# Patient Record
Sex: FEMALE | Race: ASIAN | Employment: FULL TIME | URBAN - METROPOLITAN AREA
[De-identification: names, ages, dates, MRNs, and addresses within clinical notes are randomized per-mention and may not be internally consistent; named-entity substitution may affect disease eponyms.]

---

## 2017-02-09 ENCOUNTER — TRANSFERRED RECORDS (OUTPATIENT)
Dept: HEALTH INFORMATION MANAGEMENT | Facility: CLINIC | Age: 32
End: 2017-02-09

## 2017-02-09 LAB
C TRACH DNA SPEC QL PROBE+SIG AMP: NEGATIVE
N GONORRHOEA DNA SPEC QL PROBE+SIG AMP: NEGATIVE
PAP-ABSTRACT: NORMAL
SPECIMEN DESCRIP: NORMAL
SPECIMEN DESCRIPTION: NORMAL

## 2017-04-25 ENCOUNTER — OFFICE VISIT (OUTPATIENT)
Dept: FAMILY MEDICINE | Facility: CLINIC | Age: 32
End: 2017-04-25
Payer: COMMERCIAL

## 2017-04-25 VITALS
WEIGHT: 131 LBS | BODY MASS INDEX: 23.21 KG/M2 | HEART RATE: 101 BPM | TEMPERATURE: 99.1 F | RESPIRATION RATE: 16 BRPM | SYSTOLIC BLOOD PRESSURE: 122 MMHG | DIASTOLIC BLOOD PRESSURE: 82 MMHG | HEIGHT: 63 IN

## 2017-04-25 DIAGNOSIS — N91.2 AMENORRHEA: Primary | ICD-10-CM

## 2017-04-25 DIAGNOSIS — F15.20 OTHER STIMULANT DEPENDENCE, UNCOMPLICATED (H): ICD-10-CM

## 2017-04-25 DIAGNOSIS — F90.0 ADHD (ATTENTION DEFICIT HYPERACTIVITY DISORDER), INATTENTIVE TYPE: ICD-10-CM

## 2017-04-25 LAB — BETA HCG QUAL IFA URINE: NEGATIVE

## 2017-04-25 PROCEDURE — 84703 CHORIONIC GONADOTROPIN ASSAY: CPT | Performed by: FAMILY MEDICINE

## 2017-04-25 PROCEDURE — 99203 OFFICE O/P NEW LOW 30 MIN: CPT | Performed by: FAMILY MEDICINE

## 2017-04-25 RX ORDER — DEXTROAMPHETAMINE SACCHARATE, AMPHETAMINE ASPARTATE, DEXTROAMPHETAMINE SULFATE AND AMPHETAMINE SULFATE 5; 5; 5; 5 MG/1; MG/1; MG/1; MG/1
20 TABLET ORAL
COMMUNITY
Start: 2017-03-12 | End: 2017-06-01

## 2017-04-25 RX ORDER — NORETHINDRONE ACETATE AND ETHINYL ESTRADIOL 1MG-20(21)
KIT ORAL
COMMUNITY
Start: 2017-02-09 | End: 2017-07-08

## 2017-04-25 RX ORDER — CHLORAL HYDRATE 500 MG
CAPSULE ORAL
COMMUNITY
Start: 2015-10-22 | End: 2018-07-23

## 2017-04-25 RX ORDER — LISDEXAMFETAMINE DIMESYLATE 30 MG/1
30 CAPSULE ORAL
COMMUNITY
Start: 2017-04-12 | End: 2017-05-12

## 2017-04-25 NOTE — Clinical Note
Please abstract the following data from this visit with this patient into the appropriate field in Epic:  Pap smear done on this date: 02/2017 (approximately), by this group: [facility], results were wnl.

## 2017-04-25 NOTE — LETTER
[facility]    04/25/17    Patient: Alejandra Huertas  YOB: 1985  Medical Record Number: [id number]                                                                  Controlled Substance Agreement  I understand that my care provider has prescribed controlled substances (narcotics, tranquilizers, and/or stimulants) to help manage my condition(s).  I am taking this medicine to help me function or work.  I know that this is strong medicine.  It could have serious side effects and even cause a dependency on the drug.  If I stop these medicines suddenly, I could have severe withdrawal symptoms.    The risks, benefits, and side effects of these medication(s) were explained to me.  I agree that:  1. I will take part in other treatments as advised by my provider.  This may be psychiatry or counseling, physical therapy, behavioral therapy, group treatment, or a referral to a pain clinic.  I will reduce or stop my medicine when my provider tells me to do so.   2. I will take my medicines as prescribed.  I will not change the dose or schedule unless my provider tells me to.  There will be no refills if I  run out early.   I may be contacted at any time without warning and asked to complete a drug test or pill count.   3. I will keep all my appointments at the clinic.  If I miss appointments or fail to follow instructions, my provider may stop my medicine.  4. I will not ask other providers to prescribe controlled substances. And I will not accept controlled substances from other people. If I need another prescribed controlled substance for a new reason, I will notify my provider within one business day.  5. If I enroll in the [place] Medical Marijuana program, I will tell my provider.  I will also sign an agreement to share my medical records with my provider.  6. I will use one pharmacy to fill all of my controlled substance prescriptions.  If my prescription is mailed to my pharmacy, it may  take 5 to 7 days for my medicine to be ready.  7. I understand that my provider, clinic care team, and pharmacy can track controlled substance prescriptions from other providers through a central database (prescription monitoring program).  8. I will bring in my list of medications (or my medicine bottles) each time I come to the clinic.  REV-  04/2016                                                                                                                                            Page 1 of 2      [facility]    04/25/17    Patient: Alejandra Huertas  YOB: 1985  Medical Record Number: [id number]    9. Refills of controlled substances will be made only during office hours.  It is up to me to make sure that I do not run out of my medicines on weekends or holidays.    10. I am responsible for my prescriptions.  If the medicine is lost or stolen, it will not be replaced.   I also agree not to share these medicines with anyone.  11. I agree to not use ANY illegal or recreational drugs.  This includes marijuana, cocaine, bath salts or other drugs.  I agree not to use alcohol unless my provider says I may.  I agree to give urine samples whenever asked.  If I fail to give a urine sample, the provider may stop my medicine.     12. I will tell my nurse or provider right away if I become pregnant or have a new medical problem treated outside of [facility].  13. I understand that this medicine can affect my thinking and judgment.  It may be unsafe for me to drive, use machinery and do dangerous tasks.  I will not do any of these things until I know how the medicine affects me.  If my dose changes, I will wait to see how it affects me.  I will contact my provider if I have concerns about medicine side effects.  I understand that if I do not follow any of the conditions above, my prescriptions or treatment may be stopped.    I agree that my provider, clinic care team, and pharmacy may  work with any city, state or federal law enforcement agency that investigates the misuse, sale, or other diversion of my controlled medicine. I will allow my provider to discuss my care with or share a copy of this agreement with any other treating provider, pharmacy or emergency room where I receive care.  I agree to give up (waive) any right of privacy or confidentiality with respect to these authorizations.   I have read this agreement and have asked questions about anything I did not understand.   ___________________________________    ___________________________  Patient Signature                                                           Date and Time  ___________________________________     ____________________________  Witness                                                                            Date and Time  ___________________________________  Roselia Shruthi Clancy MD  REV-  04/2016                                                                                                                                                                 Page 2 of 2

## 2017-04-25 NOTE — PROGRESS NOTES
SUBJECTIVE:                                                    Alejandra Huertas is a 31 year old female who presents to clinic today for the following health issues:    Establish care    Amenorrhea since 12/2016  Had copper IUD for about a year and took it out middle of last year. Following that she started continuous OCP for about 6 months and stopped that end of December. At that time, she was switched to monthly OCP.  Reports some spotting in December and possibly January but has not had a full period yet.     Patient is also switching her care to [place]. Has a history of ADHD- inattentive type.   Currently takes Vyvanse 30 mg and Adderall 20 mg at midday. States the midday adderall helps her complete her school assignments. Does not usually take it on the weekends when she is not in school.     Social- single parent.     Problem list and histories reviewed & adjusted, as indicated.  Additional history: as documented    Patient Active Problem List   Diagnosis     ADHD (attention deficit hyperactivity disorder), inattentive type     Adolescent idiopathic scoliosis     Controlled substance agreement signed     Other stimulant dependence, uncomplicated (H)     Past Surgical History:   Procedure Laterality Date     wisdom teeth  2007       Social History   Substance Use Topics     Smoking status: Never Smoker     Smokeless tobacco: Never Used     Alcohol use No     Family History   Problem Relation Age of Onset     Adopted: Yes           Reviewed and updated as needed this visit by clinical staff  Tobacco  Allergies  Surg Hx  Fam Hx  Soc Hx      Reviewed and updated as needed this visit by Provider         ROS:  Constitutional, HEENT, cardiovascular, pulmonary, GI, , musculoskeletal, neuro, skin, endocrine and psych systems are negative, except as otherwise noted.    OBJECTIVE:                                                    /82 (BP Location: Right arm, Patient Position: Chair, Cuff Size: Adult  "Regular)  Pulse 101  Temp 99.1  F (37.3  C) (Oral)  Resp 16  Ht 5' 3\" (1.6 m)  Wt 131 lb (59.4 kg)  LMP 12/26/2016  Breastfeeding? No  BMI 23.21 kg/m2  Body mass index is 23.21 kg/(m^2).  GENERAL: healthy, alert and no distress  HENT: ear canals and TM's normal, nose and mouth without ulcers or lesions  NECK: no adenopathy, no asymmetry, masses, or scars and thyroid normal to palpation  RESP: lungs clear to auscultation - no rales, rhonchi or wheezes  CV: regular rate and rhythm, normal S1 S2, no S3 or S4, no murmur, click or rub, no peripheral edema and peripheral pulses strong  ABDOMEN: soft, nontender, no hepatosplenomegaly, no masses and bowel sounds normal  MS: no gross musculoskeletal defects noted, no edema  PSYCH: mentation appears normal, affect normal/bright    Diagnostic Test Results:  Results for orders placed or performed in visit on 04/25/17   Beta HCG qual IFA urine   Result Value Ref Range    Beta HCG Qual IFA Urine Negative NEG        ASSESSMENT/PLAN:                                                        1. Amenorrhea  - UPT negative. Discussed with patient, it can take a while for periods to normalize especially with all the changes she has had over the last few months with OCP. Discussed stopping OCP use for now vs. Taking OCP daily as is for now and revisit in 3 months if periods have not regulated itself. Patient is agreeable to plan.   - Beta HCG qual IFA urine    2. ADHD (attention deficit hyperactivity disorder), inattentive type  - will continue patient on current regimen of vyvanse 30 mg in the morning and adderall 20mg in the afternoon. Controlled substance agreement signed today.     3. Other stimulant dependence, uncomplicated (H)  - as above     See Patient Instructions    Roselia Clancy MD  [facility]"

## 2017-04-25 NOTE — MR AVS SNAPSHOT
"              After Visit Summary   2017    Alejandra Huertas    MRN: [id number]           Patient Information     Date Of Birth          1985        Visit Information        Provider Department      2017 2:30 PM Roselia Clancy MD [facility]        Today's Diagnoses     Amenorrhea    -  1    ADHD (attention deficit hyperactivity disorder), inattentive type          Care Instructions    Follow up in 3 months or sooner if needed        Follow-ups after your visit        Who to contact     If you have questions or need follow up information about today's clinic visit or your schedule please contact [facility] directly at [phone].  Normal or non-critical lab and imaging results will be communicated to you by MyChart, letter or phone within 4 business days after the clinic has received the results. If you do not hear from us within 7 days, please contact the clinic through MyChart or phone. If you have a critical or abnormal lab result, we will notify you by phone as soon as possible.  Submit refill requests through [employer] or call your pharmacy and they will forward the refill request to us. Please allow 3 business days for your refill to be completed.          Additional Information About Your Visit        MyChart Information     [employer] lets you send messages to your doctor, view your test results, renew your prescriptions, schedule appointments and more. To sign up, go to [email or web address] . Click on \"Log in\" on the left side of the screen, which will take you to the Welcome page. Then click on \"Sign up Now\" on the right side of the page.     You will be asked to enter the access code listed below, as well as some personal information. Please follow the directions to create your username and password.     Your access code is: [email or web address]  Expires: 2017  3:08 PM     Your access code will  in 90 days. If you need help or a new code, " "please call your [facility] clinic or [phone].        Care EveryWhere ID     This is your Care EveryWhere ID. This could be used by other organizations to access your [facility] medical records  [id number]        Your Vitals Were     Pulse Temperature Respirations Height Last Period Breastfeeding?    101 99.1  F (37.3  C) (Oral) 16 5' 3\" (1.6 m) 12/26/2016 No    BMI (Body Mass Index)                   23.21 kg/m2            Blood Pressure from Last 3 Encounters:   04/25/17 122/82    Weight from Last 3 Encounters:   04/25/17 131 lb (59.4 kg)              We Performed the Following     Beta HCG qual IFA urine        Primary Care Provider    None Specified       No primary provider on file.        Thank you!     Thank you for choosing [facility]  for your care. Our goal is always to provide you with excellent care. Hearing back from our patients is one way we can continue to improve our services. Please take a few minutes to complete the written survey that you may receive in the mail after your visit with us. Thank you!             Your Updated Medication List - Protect others around you: Learn how to safely use, store and throw away your medicines at www.disposemymeds.org.          This list is accurate as of: 4/25/17  3:08 PM.  Always use your most recent med list.                   Brand Name Dispense Instructions for use    amphetamine-dextroamphetamine 20 MG per tablet    ADDERALL     Take 20 mg by mouth 1 tab QD       BLISOVI FE 1/20 1-20 MG-MCG per tablet   Generic drug:  norethindrone-ethinyl estradiol      Take 1 Tab by mouth daily.       fish oil-omega-3 fatty acids 1000 MG capsule      Take 2 capsules by mouth daily (every 24 hours).       lisdexamfetamine 30 MG capsule    VYVANSE     Take 30 mg by mouth Take 30 mg by mouth         "

## 2017-04-25 NOTE — NURSING NOTE
"Chief Complaint   Patient presents with     Amenorrhea     pt currenlty OCP and hasnt had menses since 12/2016     Attention Deficit Disorder     consult ADD       Initial /82 (BP Location: Right arm, Patient Position: Chair, Cuff Size: Adult Regular)  Pulse 101  Temp 99.1  F (37.3  C) (Oral)  Resp 16  Ht 5' 3\" (1.6 m)  Wt 131 lb (59.4 kg)  LMP 12/26/2016  Breastfeeding? No  BMI 23.21 kg/m2 Estimated body mass index is 23.21 kg/(m^2) as calculated from the following:    Height as of this encounter: 5' 3\" (1.6 m).    Weight as of this encounter: 131 lb (59.4 kg).  Medication Reconciliation: complete     Kristy Chow/PATIENCE  [place]      "

## 2017-04-28 PROBLEM — Z79.899 CONTROLLED SUBSTANCE AGREEMENT SIGNED: Status: ACTIVE | Noted: 2017-04-28

## 2017-04-28 PROBLEM — F15.20 OTHER STIMULANT DEPENDENCE, UNCOMPLICATED (H): Status: ACTIVE | Noted: 2017-04-28

## 2017-06-01 DIAGNOSIS — F90.0 ADHD (ATTENTION DEFICIT HYPERACTIVITY DISORDER), INATTENTIVE TYPE: ICD-10-CM

## 2017-06-01 RX ORDER — DEXTROAMPHETAMINE SACCHARATE, AMPHETAMINE ASPARTATE, DEXTROAMPHETAMINE SULFATE AND AMPHETAMINE SULFATE 5; 5; 5; 5 MG/1; MG/1; MG/1; MG/1
TABLET ORAL
Qty: 30 TABLET | Refills: 0 | Status: SHIPPED | OUTPATIENT
Start: 2017-06-01 | End: 2018-02-12 | Stop reason: ALTCHOICE

## 2017-06-01 RX ORDER — LISDEXAMFETAMINE DIMESYLATE 30 MG/1
30 CAPSULE ORAL EVERY MORNING
Qty: 30 CAPSULE | Refills: 0 | Status: SHIPPED | OUTPATIENT
Start: 2017-06-01 | End: 2017-08-31

## 2017-06-01 NOTE — TELEPHONE ENCOUNTER
Controlled Substance Refill Request for adderall 20mg  Problem List Complete:  No     PROVIDER TO CONSIDER COMPLETION OF PROBLEM LIST AND OVERVIEW/CONTROLLED SUBSTANCE AGREEMENT    Last Written Prescription Date:  04/28/2017  Last Fill Quantity: 30,   # refills: 0    Last Office Visit with [facility] primary care provider: [phone]    Future Office visit:     Controlled substance agreement on file: Yes:  Date [id number].     Processing:  Staff will hand deliver Rx to on-site pharmacy   checked in past 6 months?  No, route to RN    Thanks,  Tiffany Sherwood CPhT  [facility]  [phone]

## 2017-07-08 DIAGNOSIS — Z30.09 GENERAL COUNSELING FOR PRESCRIPTION OF ORAL CONTRACEPTIVES: Primary | ICD-10-CM

## 2017-07-08 RX ORDER — NORETHINDRONE ACETATE AND ETHINYL ESTRADIOL 1MG-20(21)
1 KIT ORAL DAILY
Qty: 28 TABLET | Refills: 0 | Status: SHIPPED | OUTPATIENT
Start: 2017-07-08 | End: 2017-07-11

## 2017-07-08 NOTE — TELEPHONE ENCOUNTER
Birth Control      Last Written Prescription Date: Unk  Last Fill Quantity: Unk,  # refills: Unk   Last Office Visit with [facility], [facility] or [facility] prescribing provider: 4/25/17                                                 Approved per pharmacist refill protocol. Patient is current on office visit and labs. Thanks.    Karol Gupta, Pharm.D  Pharmacist in Charge  [facility]

## 2017-08-31 DIAGNOSIS — F90.0 ADHD (ATTENTION DEFICIT HYPERACTIVITY DISORDER), INATTENTIVE TYPE: ICD-10-CM

## 2017-08-31 NOTE — TELEPHONE ENCOUNTER
Controlled Substance Refill Request for Vyvanse 30  Problem List Complete:  Yes  Patient is followed by GAYLE HADLEY for ongoing prescription of stimulants.  All refills should be approved by this provider, or covering partner.    Medication(s): Adderall 20 mg. (Got vyvanse 30 last time from [facility])  Maximum quantity per month: 30  Clinic visit frequency required: Q 3 months     Controlled substance agreement on file: Yes       Date(s): 4/25/17  Neuropsych evaluation for ADD completed:  No    Last [facility] website verification:  done on 6/1/17   [email or web address]         checked in past 6 months?  Yes 6/01/2017     Please walk signed prescription to pharmacy.  Thanks.  Tiffany Sherwood CPhT  [facility]  [phone]

## 2017-09-01 RX ORDER — LISDEXAMFETAMINE DIMESYLATE 30 MG/1
30 CAPSULE ORAL EVERY MORNING
Qty: 30 CAPSULE | Refills: 0 | Status: SHIPPED | OUTPATIENT
Start: 2017-09-01 | End: 2017-12-29

## 2017-09-11 ENCOUNTER — OFFICE VISIT (OUTPATIENT)
Dept: FAMILY MEDICINE | Facility: CLINIC | Age: 32
End: 2017-09-11
Payer: COMMERCIAL

## 2017-09-11 VITALS
HEIGHT: 63 IN | SYSTOLIC BLOOD PRESSURE: 105 MMHG | HEART RATE: 83 BPM | DIASTOLIC BLOOD PRESSURE: 71 MMHG | OXYGEN SATURATION: 99 % | TEMPERATURE: 99.5 F | WEIGHT: 131 LBS | BODY MASS INDEX: 23.21 KG/M2

## 2017-09-11 DIAGNOSIS — Z23 NEED FOR PROPHYLACTIC VACCINATION AND INOCULATION AGAINST INFLUENZA: ICD-10-CM

## 2017-09-11 DIAGNOSIS — Z00.00 ROUTINE GENERAL MEDICAL EXAMINATION AT A HEALTH CARE FACILITY: Primary | ICD-10-CM

## 2017-09-11 PROCEDURE — 99395 PREV VISIT EST AGE 18-39: CPT | Mod: 25 | Performed by: PHYSICIAN ASSISTANT

## 2017-09-11 PROCEDURE — 90686 IIV4 VACC NO PRSV 0.5 ML IM: CPT | Performed by: PHYSICIAN ASSISTANT

## 2017-09-11 PROCEDURE — 90471 IMMUNIZATION ADMIN: CPT | Performed by: PHYSICIAN ASSISTANT

## 2017-09-11 NOTE — PROGRESS NOTES
SUBJECTIVE:   CC: Alejandra Huertas is an 32 year old woman who presents for preventive health visit.     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)          Today's PHQ-2 Score:   PHQ-2 ( 1999 [employer]) 9/11/2017   Q1: Little interest or pleasure in doing things 0   Q2: Feeling down, depressed or hopeless 0   PHQ-2 Score 0   Q1: Little interest or pleasure in doing things Not at all   Q2: Feeling down, depressed or hopeless Not at all   PHQ-2 Score 0       Abuse: Current or Past(Physical, Sexual or Emotional)- No  Do you feel safe in your environment - Yes    Social History   Substance Use Topics     Smoking status: Never Smoker     Smokeless tobacco: Never Used     Alcohol use No     The patient does not drink >3 drinks per day nor >7 drinks per week.    Reviewed orders with patient.  Reviewed health maintenance and updated orders accordingly - Yes  Patient Active Problem List   Diagnosis     ADHD (attention deficit hyperactivity disorder), inattentive type     Adolescent idiopathic scoliosis     Controlled substance agreement signed     Other stimulant dependence, uncomplicated (H)     Past Surgical History:   Procedure Laterality Date     wisdom teeth  2007       Social History   Substance Use Topics     Smoking status: Never Smoker     Smokeless tobacco: Never Used     Alcohol use No     Family History   Problem Relation Age of Onset     Adopted: Yes         Current Outpatient Prescriptions   Medication Sig Dispense Refill     lisdexamfetamine (VYVANSE) 30 MG capsule Take 1 capsule (30 mg) by mouth  "every morning 30 capsule 0     BOB FE 1/20 1-20 MG-MCG per tablet TAKE ONE TABLET BY MOUTH ONCE DAILY 84 tablet 0     amphetamine-dextroamphetamine (ADDERALL) 20 MG per tablet 1 tablet In the afternoon 30 tablet 0     fish oil-omega-3 fatty acids 1000 MG capsule Take 2 capsules by mouth daily (every 24 hours).       Allergies   Allergen Reactions     Minocycline      hives          Mammogram not appropriate for this patient based on age.    Pertinent mammograms are reviewed under the imaging tab.  History of abnormal Pap smear: NO - age 30- 65 PAP every 3 years recommended    Reviewed and updated as needed this visit by clinical staffTobacco  Allergies  Med Hx  Surg Hx  Fam Hx  Soc Hx        Reviewed and updated as needed this visit by Provider          ROS:  C: NEGATIVE for fever, chills, change in weight  I: NEGATIVE for worrisome rashes, moles or lesions  E: NEGATIVE for vision changes or irritation  ENT: NEGATIVE for ear, mouth and throat problems  R: NEGATIVE for significant cough or SOB  B: NEGATIVE for masses, tenderness or discharge  CV: NEGATIVE for chest pain, palpitations or peripheral edema  GI: NEGATIVE for nausea, abdominal pain, heartburn, or change in bowel habits  : NEGATIVE for unusual urinary or vaginal symptoms. Periods are regular.  M: NEGATIVE for significant arthralgias or myalgia  N: NEGATIVE for weakness, dizziness or paresthesias  P: NEGATIVE for changes in mood or affect     OBJECTIVE:   /71 (BP Location: Right arm, Patient Position: Chair, Cuff Size: Adult Regular)  Pulse 83  Temp 99.5  F (37.5  C) (Oral)  Ht 5' 2.5\" (1.588 m)  Wt 131 lb (59.4 kg)  SpO2 99%  BMI 23.58 kg/m2  EXAM:  GENERAL: healthy, alert and no distress  EYES: Eyes grossly normal to inspection, PERRL and conjunctivae and sclerae normal  HENT: ear canals and TM's normal, nose and mouth without ulcers or lesions  NECK: no adenopathy, no asymmetry, masses, or scars and thyroid normal to palpation  RESP: " "lungs clear to auscultation - no rales, rhonchi or wheezes  BREAST: deferred  CV: regular rate and rhythm, normal S1 S2, no S3 or S4, no murmur, click or rub, no peripheral edema and peripheral pulses strong  ABDOMEN: soft, nontender, no hepatosplenomegaly, no masses and bowel sounds normal   (female): deferred  MS: no gross musculoskeletal defects noted, no edema  SKIN: no suspicious lesions or rashes  NEURO: Normal strength and tone, mentation intact and speech normal  PSYCH: mentation appears normal, affect normal/bright    ASSESSMENT/PLAN:   1. Routine general medical examination at a health care facility    2. Need for prophylactic vaccination and inoculation against influenza  - FLU VAC, SPLIT VIRUS IM > 3 YO (QUADRIVALENT) [39673]  - Vaccine Administration, Initial [06990]    COUNSELING:  Reviewed preventive health counseling, as reflected in patient instructions       reports that she has never smoked. She has never used smokeless tobacco.    Estimated body mass index is 23.58 kg/(m^2) as calculated from the following:    Height as of this encounter: 5' 2.5\" (1.588 m).    Weight as of this encounter: 131 lb (59.4 kg).         Counseling Resources:  ATP IV Guidelines  Pooled Cohorts Equation Calculator  Breast Cancer Risk Calculator  FRAX Risk Assessment  ICSI Preventive Guidelines  Dietary Guidelines for Americans, 2010  USDA's MyPlate  ASA Prophylaxis  Lung CA Screening    Joi Munoz PA-C  [facility] ROSEMOUNTAnswers for HPI/ROS submitted by the patient on 9/11/2017   PHQ-2 Score: 0    Injectable Influenza Immunization Documentation    1.  Are you sick today? (Fever of 100.5 or higher on the day of the clinic)   No    2.  Have you ever had Guillain-[place] Syndrome within 6 weeks of an influenza vaccionation?  No    3. Do you have a life-threatening allergy to eggs?  No    4. Do you have a life-threatening allergy to a component of the vaccine? May include antibiotics, gelatin or latex.  " No     5. Have you ever had a reaction to a dose of flu vaccine that needed immediate medical attention?  No     Form completed by pt/.Cassandra ROLLINS MA

## 2017-09-11 NOTE — NURSING NOTE
"Chief Complaint   Patient presents with     Physical     Flu Shot       Initial /71 (BP Location: Right arm, Patient Position: Chair, Cuff Size: Adult Regular)  Pulse 83  Temp 99.5  F (37.5  C) (Oral)  Ht 5' 2.5\" (1.588 m)  Wt 131 lb (59.4 kg)  SpO2 99%  BMI 23.58 kg/m2 Estimated body mass index is 23.58 kg/(m^2) as calculated from the following:    Height as of this encounter: 5' 2.5\" (1.588 m).    Weight as of this encounter: 131 lb (59.4 kg).  Medication Reconciliation: complete.Cassandra ROLLINS MA      "

## 2017-09-11 NOTE — MR AVS SNAPSHOT
After Visit Summary   9/11/2017    Alejandar Huertas    MRN: [id number]           Patient Information     Date Of Birth          1985        Visit Information        Provider Department      9/11/2017 1:50 PM Joi Munoz PA-C [facility]        Today's Diagnoses     Routine general medical examination at a health care facility    -  1    Need for prophylactic vaccination and inoculation against influenza          Care Instructions      Preventive Health Recommendations  Female Ages 26 - 39  Yearly exam:   See your health care provider every year in order to    Review health changes.     Discuss preventive care.      Review your medicines if you your doctor has prescribed any.    Until age 30: Get a Pap test every three years (more often if you have had an abnormal result).    After age 30: Talk to your doctor about whether you should have a Pap test every 3 years or have a Pap test with HPV screening every 5 years.   You do not need a Pap test if your uterus was removed (hysterectomy) and you have not had cancer.  You should be tested each year for STDs (sexually transmitted diseases), if you're at risk.   Talk to your provider about how often to have your cholesterol checked.  If you are at risk for diabetes, you should have a diabetes test (fasting glucose).  Shots: Get a flu shot each year. Get a tetanus shot every 10 years.   Nutrition:     Eat at least 5 servings of fruits and vegetables each day.    Eat whole-grain bread, whole-wheat pasta and brown rice instead of white grains and rice.    Talk to your provider about Calcium and Vitamin D.     Lifestyle    Exercise at least 150 minutes a week (30 minutes a day, 5 days of the week). This will help you control your weight and prevent disease.    Limit alcohol to one drink per day.    No smoking.     Wear sunscreen to prevent skin cancer.    See your dentist every six months for an exam and cleaning.             "Follow-ups after your visit        Who to contact     If you have questions or need follow up information about today's clinic visit or your schedule please contact [facility] directly at [phone].  Normal or non-critical lab and imaging results will be communicated to you by MyChart, letter or phone within 4 business days after the clinic has received the results. If you do not hear from us within 7 days, please contact the clinic through MyChart or phone. If you have a critical or abnormal lab result, we will notify you by phone as soon as possible.  Submit refill requests through Mingleplay or call your pharmacy and they will forward the refill request to us. Please allow 3 business days for your refill to be completed.          Additional Information About Your Visit        Mingleplay Information     Mingleplay lets you send messages to your doctor, view your test results, renew your prescriptions, schedule appointments and more. To sign up, go to [email or web address] . Click on \"Log in\" on the left side of the screen, which will take you to the Welcome page. Then click on \"Sign up Now\" on the right side of the page.     You will be asked to enter the access code listed below, as well as some personal information. Please follow the directions to create your username and password.     Your access code is: [email or web address]  Expires: 12/10/2017  2:43 PM     Your access code will  in 90 days. If you need help or a new code, please call your [place] clinic or [phone].        Care EveryWhere ID     This is your Care EveryWhere ID. This could be used by other organizations to access your [place] medical records  [id number]        Your Vitals Were     Pulse Temperature Height Pulse Oximetry BMI (Body Mass Index)       83 99.5  F (37.5  C) (Oral) 5' 2.5\" (1.588 m) 99% 23.58 kg/m2        Blood Pressure from Last 3 Encounters:   17 105/71   17 122/82    Weight from Last 3 Encounters: "   09/11/17 131 lb (59.4 kg)   04/25/17 131 lb (59.4 kg)              We Performed the Following     FLU VAC, SPLIT VIRUS IM > 3 YO (QUADRIVALENT) [35080]     Vaccine Administration, Initial [08223]        Primary Care Provider    None Specified       No primary provider on file.        Equal Access to Services     [facility] : Hadii tito ku hadjorge ao Soshiraali, waaxda luqadaha, qaybta kaalmada batsheva, bennett benjaminpapoitalo blanchard. So [facility] [phone].    ATENCIÓN: Si habla español, tiene a mcmillan disposición servicios gratuitos de asistencia lingüística. Danny al [phone].    We comply with applicable federal civil rights laws and [place] laws. We do not discriminate on the basis of race, color, national origin, age, disability sex, sexual orientation or gender identity.            Thank you!     Thank you for choosing [facility]  [facility] your [facility]. Our goal is always to provide you with excellent care. Hearing back from our patients is one way we can continue to improve our services. Please take a few minutes to complete the written survey that you may receive in the mail after your visit with us. Thank you!             Your Updated Medication List - Protect others around you: Learn how to safely use, store and throw away your medicines at www.disposemymeds.org.          This list is accurate as of: 9/11/17  2:43 PM.  Always use your most recent med list.                   Brand Name Dispense Instructions for use Diagnosis    amphetamine-dextroamphetamine 20 MG per tablet    ADDERALL    30 tablet    1 tablet In the afternoon    ADHD (attention deficit hyperactivity disorder), inattentive type       fish oil-omega-3 fatty acids 1000 MG capsule      Take 2 capsules by mouth daily (every 24 hours).        BOB FE 1/20 1-20 MG-MCG per tablet   Generic drug:  norethindrone-ethinyl estradiol     84 tablet    TAKE ONE TABLET BY MOUTH ONCE DAILY    General counseling for prescription of oral  contraceptives       lisdexamfetamine 30 MG capsule    VYVANSE    30 capsule    Take 1 capsule (30 mg) by mouth every morning    ADHD (attention deficit hyperactivity disorder), inattentive type

## 2017-11-30 DIAGNOSIS — Z30.09 GENERAL COUNSELING FOR PRESCRIPTION OF ORAL CONTRACEPTIVES: ICD-10-CM

## 2017-12-01 NOTE — TELEPHONE ENCOUNTER
I have not prescribed this before, would like to discuss with patient before prescribing via e visit or phone visit.     Joi uMnoz PA-C

## 2017-12-01 NOTE — TELEPHONE ENCOUNTER
Requested Prescriptions   Pending Prescriptions Disp Refills     BOB FE 1/20 1-20 MG-MCG per tablet [Pharmacy Med Name: BOB FE 1/20 1-20MG-MCG TABS] 84 tablet 0     Sig: TAKE ONE TABLET BY MOUTH EVERY DAY    Contraceptives Protocol Passed    12/1/2017  8:48 AM       Passed - Patient is not a current smoker if age is 35 or older       Passed - Recent or future visit with authorizing provider's specialty    Patient had office visit in the last year or has a visit in the next 30 days with authorizing provider.  See chart review.          Passed - No active pregnancy on record       Passed - No positive pregnancy test in past 12 months        Routing refill request to provider for review/approval because:  No pap on file, prescribed by another provider last refill.  Peggy Elliott, RN  Triage Nurse

## 2017-12-01 NOTE — TELEPHONE ENCOUNTER
Called patient to ask if she would do a visit for this,   Had discussed with prior provider possibly going off of it.   Left message for her to call us back.  Peggy Elliott, RN  Triage Nurse

## 2017-12-07 RX ORDER — NORETHINDRONE ACETATE/ETHINYL ESTRADIOL AND FERROUS FUMARATE 1MG-20(21)
KIT ORAL
Qty: 84 TABLET | Refills: 0 | OUTPATIENT
Start: 2017-12-07

## 2017-12-29 ENCOUNTER — OFFICE VISIT (OUTPATIENT)
Dept: INTERNAL MEDICINE | Facility: CLINIC | Age: 32
End: 2017-12-29
Payer: MEDICAID

## 2017-12-29 VITALS
TEMPERATURE: 98.3 F | BODY MASS INDEX: 23.51 KG/M2 | OXYGEN SATURATION: 100 % | DIASTOLIC BLOOD PRESSURE: 68 MMHG | SYSTOLIC BLOOD PRESSURE: 112 MMHG | HEART RATE: 99 BPM | WEIGHT: 132.7 LBS | HEIGHT: 63 IN

## 2017-12-29 DIAGNOSIS — Z30.09 GENERAL COUNSELING FOR PRESCRIPTION OF ORAL CONTRACEPTIVES: ICD-10-CM

## 2017-12-29 DIAGNOSIS — F90.0 ADHD (ATTENTION DEFICIT HYPERACTIVITY DISORDER), INATTENTIVE TYPE: ICD-10-CM

## 2017-12-29 PROCEDURE — 99213 OFFICE O/P EST LOW 20 MIN: CPT | Performed by: INTERNAL MEDICINE

## 2017-12-29 RX ORDER — NORETHINDRONE ACETATE AND ETHINYL ESTRADIOL 1MG-20(21)
1 KIT ORAL DAILY
Qty: 84 TABLET | Refills: 0 | Status: SHIPPED | OUTPATIENT
Start: 2017-12-29 | End: 2018-02-02

## 2017-12-29 RX ORDER — MULTIPLE VITAMINS W/ MINERALS TAB 9MG-400MCG
1 TAB ORAL DAILY
Qty: 100 TABLET | Refills: 3 | COMMUNITY
Start: 2017-12-29 | End: 2018-07-23

## 2017-12-29 RX ORDER — LISDEXAMFETAMINE DIMESYLATE 30 MG/1
30 CAPSULE ORAL EVERY MORNING
Qty: 30 CAPSULE | Refills: 0 | Status: SHIPPED | OUTPATIENT
Start: 2017-12-29 | End: 2018-02-02

## 2017-12-29 NOTE — NURSING NOTE
"Chief Complaint   Patient presents with     Recheck Medication       Initial /68 (BP Location: Right arm, Patient Position: Chair, Cuff Size: Adult Regular)  Pulse 99  Temp 98.3  F (36.8  C) (Oral)  Ht 5' 2.5\" (1.588 m)  Wt 132 lb 11.2 oz (60.2 kg)  SpO2 100%  Breastfeeding? No  BMI 23.88 kg/m2 Estimated body mass index is 23.88 kg/(m^2) as calculated from the following:    Height as of this encounter: 5' 2.5\" (1.588 m).    Weight as of this encounter: 132 lb 11.2 oz (60.2 kg).  Medication Reconciliation: complete   Analy Curry CMA      "

## 2017-12-29 NOTE — MR AVS SNAPSHOT
"              After Visit Summary   12/29/2017    Alejandra Huertas    MRN: [id number]           Patient Information     Date Of Birth          1985        Visit Information        Provider Department      12/29/2017 10:40 AM Dilia Lu MD [facility]        Today's Diagnoses     General counseling for prescription of oral contraceptives        ADHD (attention deficit hyperactivity disorder), inattentive type           Follow-ups after your visit        Who to contact     If you have questions or need follow up information about today's clinic visit or your schedule please contact [facility] directly at [phone].  Normal or non-critical lab and imaging results will be communicated to you by MyChart, letter or phone within 4 business days after the clinic has received the results. If you do not hear from us within 7 days, please contact the clinic through Syntaxint or phone. If you have a critical or abnormal lab result, we will notify you by phone as soon as possible.  Submit refill requests through PollVaultr or call your pharmacy and they will forward the refill request to us. Please allow 3 business days for your refill to be completed.          Additional Information About Your Visit        MyChart Information     PollVaultr gives you secure access to your electronic health record. If you see a primary care provider, you can also send messages to your care team and make appointments. If you have questions, please call your primary care clinic.  If you do not have a primary care provider, please call [phone] and they will assist you.        Care EveryWhere ID     This is your Care EveryWhere ID. This could be used by other organizations to access your [place] medical records  [id number]        Your Vitals Were     Pulse Temperature Height Pulse Oximetry Breastfeeding? BMI (Body Mass Index)    99 98.3  F (36.8  C) (Oral) 5' 2.5\" (1.588 m) 100% No 23.88 " kg/m2       Blood Pressure from Last 3 Encounters:   12/29/17 112/68   09/11/17 105/71   04/25/17 122/82    Weight from Last 3 Encounters:   12/29/17 132 lb 11.2 oz (60.2 kg)   09/11/17 131 lb (59.4 kg)   04/25/17 131 lb (59.4 kg)              Today, you had the following     No orders found for display         Today's Medication Changes          These changes are accurate as of: 12/29/17 11:59 PM.  If you have any questions, ask your nurse or doctor.               These medicines have changed or have updated prescriptions.        Dose/Directions    norethindrone-ethinyl estradiol 1-20 MG-MCG per tablet   Commonly known as:  BOB FE 1/20   This may have changed:  See the new instructions.   Used for:  General counseling for prescription of oral contraceptives   Changed by:  Dilia Lu MD        Dose:  1 tablet   Take 1 tablet by mouth daily   Quantity:  84 tablet   Refills:  0            Where to get your medicines      These medications were sent to [facility] [address]  [id number] [place] [address]     Phone:  [phone]     norethindrone-ethinyl estradiol 1-20 MG-MCG per tablet         Some of these will need a paper prescription and others can be bought over the counter.  Ask your nurse if you have questions.     Bring a paper prescription for each of these medications     lisdexamfetamine 30 MG capsule                Primary Care Provider Office Phone # Fax #    Roselia Clancy [phone] [address] [facility] [address]        Equal Access to Services     [facility]: Hadyary Osborne, wamargareth luarlette, qaybta kaalbennett tompkins. So [facility] [phone].    ATENCIÓN: Si habla español, tiene a mcmillan disposición servicios gratuitos de asistencia lingüística. Danny mckeon [phone].    We comply with applicable federal civil rights laws and  [place] laws. We do not discriminate on the basis of race, color, national origin, age, disability, sex, sexual orientation, or gender identity.            Thank you!     Thank you for choosing [facility]  for your care. Our goal is always to provide you with excellent care. Hearing back from our patients is one way we can continue to improve our services. Please take a few minutes to complete the written survey that you may receive in the mail after your visit with us. Thank you!             Your Updated Medication List - Protect others around you: Learn how to safely use, store and throw away your medicines at www.disposemymeds.org.          This list is accurate as of: 12/29/17 11:59 PM.  Always use your most recent med list.                   Brand Name Dispense Instructions for use Diagnosis    amphetamine-dextroamphetamine 20 MG per tablet    ADDERALL    30 tablet    1 tablet In the afternoon    ADHD (attention deficit hyperactivity disorder), inattentive type       fish oil-omega-3 fatty acids 1000 MG capsule      Take 2 capsules by mouth daily (every 24 hours).        lisdexamfetamine 30 MG capsule    VYVANSE    30 capsule    Take 1 capsule (30 mg) by mouth every morning    ADHD (attention deficit hyperactivity disorder), inattentive type       Multi-vitamin Tabs tablet     100 tablet    Take 1 tablet by mouth daily        norethindrone-ethinyl estradiol 1-20 MG-MCG per tablet    BOB FE 1/20    84 tablet    Take 1 tablet by mouth daily    General counseling for prescription of oral contraceptives

## 2017-12-29 NOTE — PROGRESS NOTES
"Dr Galeana's note        ASSESSMENT & PLAN:                                                      (Z30.09) General counseling for prescription of oral contraceptives  Comment:   Plan: norethindrone-ethinyl estradiol (BOB FE 1/20)        1-20 MG-MCG per tablet            (F90.0) ADHD (attention deficit hyperactivity disorder), inattentive type  Comment:   Plan: lisdexamfetamine (VYVANSE) 30 MG capsule             Chief complaint:                                                      BCP, Vyvanse       SUBJECTIVE:   Alejandra Huertas is a 32 year old female who presents to clinic today for the following health issues:      Needs help with the meds until she can get to see her own doctor.     Needs BCP refills   She answered NO to  - personal or family history of blood clots  - hx of migraines  - known breast disease  - current smoking    ADHD   -- on Vyvanse.   -- she has agreement for control substance with dr Clancy.  -- since her pcp in [place] I will give her a month refill. I don't think it will be a breach of contract       Review of Systems:                                                      ROS: negative for fever, chills, cough, wheezes, chest pain, shortness of breath, vomiting, abdominal pain, leg swelling     A 10-point review of systems was obtained.  Those pertinent are above and in the in the Subjective section.  The rest of the systems are negative.           OBJECTIVE:             Physical exam:  Blood pressure 112/68, pulse 99, temperature 98.3  F (36.8  C), temperature source Oral, height 5' 2.5\" (1.588 m), weight 132 lb 11.2 oz (60.2 kg), SpO2 100 %, not currently breastfeeding.   NAD, appears comfortable  Skin: no rashes       PMHx: reviewed  Past Medical History:   Diagnosis Date     ADHD (attention deficit hyperactivity disorder)       PSHx: reviewed  Past Surgical History:   Procedure Laterality Date     wisdom teeth  2007        Meds: reviewed  Current Outpatient Prescriptions "   Medication Sig Dispense Refill     multivitamin, therapeutic with minerals (MULTI-VITAMIN) TABS tablet Take 1 tablet by mouth daily 100 tablet 3     lisdexamfetamine (VYVANSE) 30 MG capsule Take 1 capsule (30 mg) by mouth every morning 30 capsule 0     BOB FE 1/20 1-20 MG-MCG per tablet TAKE ONE TABLET BY MOUTH ONCE DAILY 84 tablet 0     amphetamine-dextroamphetamine (ADDERALL) 20 MG per tablet 1 tablet In the afternoon 30 tablet 0     fish oil-omega-3 fatty acids 1000 MG capsule Take 2 capsules by mouth daily (every 24 hours).         Soc Hx: reviewed  Fam Hx: reviewed          Dilia Galeana MD  Internal Medicine

## 2018-02-02 ENCOUNTER — TELEPHONE (OUTPATIENT)
Dept: FAMILY MEDICINE | Facility: CLINIC | Age: 33
End: 2018-02-02

## 2018-02-02 ENCOUNTER — OFFICE VISIT (OUTPATIENT)
Dept: FAMILY MEDICINE | Facility: CLINIC | Age: 33
End: 2018-02-02
Payer: COMMERCIAL

## 2018-02-02 VITALS
WEIGHT: 129 LBS | SYSTOLIC BLOOD PRESSURE: 98 MMHG | RESPIRATION RATE: 14 BRPM | DIASTOLIC BLOOD PRESSURE: 66 MMHG | BODY MASS INDEX: 23.22 KG/M2 | HEART RATE: 78 BPM | TEMPERATURE: 98.6 F

## 2018-02-02 DIAGNOSIS — F90.0 ADHD (ATTENTION DEFICIT HYPERACTIVITY DISORDER), INATTENTIVE TYPE: ICD-10-CM

## 2018-02-02 DIAGNOSIS — F90.0 ADHD (ATTENTION DEFICIT HYPERACTIVITY DISORDER), INATTENTIVE TYPE: Primary | ICD-10-CM

## 2018-02-02 DIAGNOSIS — Z30.09 GENERAL COUNSELING FOR PRESCRIPTION OF ORAL CONTRACEPTIVES: ICD-10-CM

## 2018-02-02 PROCEDURE — 99213 OFFICE O/P EST LOW 20 MIN: CPT | Performed by: PHYSICIAN ASSISTANT

## 2018-02-02 RX ORDER — NORETHINDRONE ACETATE AND ETHINYL ESTRADIOL 1MG-20(21)
1 KIT ORAL DAILY
Qty: 84 TABLET | Refills: 2 | Status: SHIPPED | OUTPATIENT
Start: 2018-02-02 | End: 2018-10-04

## 2018-02-02 RX ORDER — LISDEXAMFETAMINE DIMESYLATE 30 MG/1
30 CAPSULE ORAL EVERY MORNING
Qty: 30 CAPSULE | Refills: 0 | Status: SHIPPED | OUTPATIENT
Start: 2018-02-02 | End: 2018-02-12

## 2018-02-02 NOTE — MR AVS SNAPSHOT
After Visit Summary   2/2/2018    Alejandra Huertas    MRN: [id number]           Patient Information     Date Of Birth          1985        Visit Information        Provider Department      2/2/2018 11:20 AM Leandra Cervantes PA-C [facility]        Today's Diagnoses     General counseling for prescription of oral contraceptives        ADHD (attention deficit hyperactivity disorder), inattentive type           Follow-ups after your visit        Who to contact     If you have questions or need follow up information about today's clinic visit or your schedule please contact [facility] directly at [phone].  Normal or non-critical lab and imaging results will be communicated to you by Unfoldhart, letter or phone within 4 business days after the clinic has received the results. If you do not hear from us within 7 days, please contact the clinic through PolyThericst or phone. If you have a critical or abnormal lab result, we will notify you by phone as soon as possible.  Submit refill requests through One Beauty Stop or call your pharmacy and they will forward the refill request to us. Please allow 3 business days for your refill to be completed.          Additional Information About Your Visit        MyChart Information     One [employer] Stop gives you secure access to your electronic health record. If you see a primary care provider, you can also send messages to your care team and make appointments. If you have questions, please call your primary care clinic.  If you do not have a primary care provider, please call [phone] and they will assist you.        Care EveryWhere ID     This is your Care EveryWhere ID. This could be used by other organizations to access your [place] medical records  [id number]        Your Vitals Were     Pulse Temperature Respirations BMI (Body Mass Index)          78 98.6  F (37  C) (Oral) 14 23.22 kg/m2         Blood Pressure from Last 3  Encounters:   02/02/18 98/66   12/29/17 112/68   09/11/17 105/71    Weight from Last 3 Encounters:   02/02/18 129 lb (58.5 kg)   12/29/17 132 lb 11.2 oz (60.2 kg)   09/11/17 131 lb (59.4 kg)              Today, you had the following     No orders found for display         Where to get your medicines      These medications were sent to [facility] [address]  [address]  [address]     Phone:  [phone]     norethindrone-ethinyl estradiol 1-20 MG-MCG per tablet         Some of these will need a paper prescription and others can be bought over the counter.  Ask your nurse if you have questions.     Bring a paper prescription for each of these medications     lisdexamfetamine 30 MG capsule          Primary Care Provider Office Phone # Fax #    Roselia Clancy [phone] [address] [facility] [address]        Equal Access to Services     [facility]: Hadii aad ku hadasho Soharpreet, waaxda luqadaha, qaybta kaalmada batsheva, bennett patel . So [facility] [phone].    ATENCIÓN: Si habla español, tiene a mcmillan disposición servicios gratuitos de asistencia lingüística. [facility] [phone].    We comply with applicable federal civil rights laws and [place] laws. We do not discriminate on the basis of race, color, national origin, age, disability, sex, sexual orientation, or gender identity.            Thank you!     Thank you for choosing [facility]  for your care. Our goal is always to provide you with excellent care. Hearing back from our patients is one way we can continue to improve our services. Please take a few minutes to complete the written survey that you may receive in the mail after your visit with us. Thank you!             Your Updated Medication List - Protect others around you: Learn how to safely use, store and throw away your medicines at  www.disposemymeds.org.          This list is accurate as of 2/2/18 11:40 AM.  Always use your most recent med list.                   Brand Name Dispense Instructions for use Diagnosis    amphetamine-dextroamphetamine 20 MG per tablet    ADDERALL    30 tablet    1 tablet In the afternoon    ADHD (attention deficit hyperactivity disorder), inattentive type       fish oil-omega-3 fatty acids 1000 MG capsule      Take 2 capsules by mouth daily (every 24 hours).        lisdexamfetamine 30 MG capsule    VYVANSE    30 capsule    Take 1 capsule (30 mg) by mouth every morning    ADHD (attention deficit hyperactivity disorder), inattentive type       Multi-vitamin Tabs tablet     100 tablet    Take 1 tablet by mouth daily        norethindrone-ethinyl estradiol 1-20 MG-MCG per tablet    BOB FE 1/20    84 tablet    Take 1 tablet by mouth daily    General counseling for prescription of oral contraceptives

## 2018-02-02 NOTE — NURSING NOTE
"Chief Complaint   Patient presents with     Recheck Medication     Vyvanse and Birth Control       Initial BP 98/66 (BP Location: Right arm, Patient Position: Chair, Cuff Size: Adult Regular)  Pulse 78  Temp 98.6  F (37  C) (Oral)  Resp 14  Wt 129 lb (58.5 kg)  LMP   BMI 23.22 kg/m2 Estimated body mass index is 23.22 kg/(m^2) as calculated from the following:    Height as of 12/29/17: 5' 2.5\" (1.588 m).    Weight as of this encounter: 129 lb (58.5 kg).  Medication Reconciliation: complete   Vicente Bauman MA      "

## 2018-02-02 NOTE — PROGRESS NOTES
SUBJECTIVE:   Alejandra Huertas is a 32 year old female who presents to clinic today for the following health issues:      Medication Followup of Vyvanse and Birth Control     Taking Medication as prescribed: yes    Side Effects:  None    Medication Helping Symptoms:  yes       Problem list and histories reviewed & adjusted, as indicated.  Additional history: as documented      Reviewed and updated as needed this visit by clinical staff  Tobacco  Allergies  Meds  Med Hx  Surg Hx  Fam Hx  Soc Hx      ROS:  Constitutional, HEENT, cardiovascular, pulmonary, gi and gu systems are negative, except as otherwise noted.    OBJECTIVE:     BP 98/66 (BP Location: Right arm, Patient Position: Chair, Cuff Size: Adult Regular)  Pulse 78  Temp 98.6  F (37  C) (Oral)  Resp 14  Wt 129 lb (58.5 kg)  LMP   BMI 23.22 kg/m2  Body mass index is 23.22 kg/(m^2).    Total visit time is 20 Minutes with > 12 Minutes spent in care and consultation regarding Medication refills with symptomatic review and follow up plan.      Diagnostic Test Results:  none     ASSESSMENT/PLAN:   1. General counseling for prescription of oral contraceptives  - norethindrone-ethinyl estradiol (BOB FE 1/20) 1-20 MG-MCG per tablet; Take 1 tablet by mouth daily  Dispense: 84 tablet; Refill: 2    2. ADHD (attention deficit hyperactivity disorder), inattentive type  - lisdexamfetamine (VYVANSE) 30 MG capsule; Take 1 capsule (30 mg) by mouth every morning  Dispense: 30 capsule; Refill: 0    Follow up in 1 month.  Patient amenable to this follow up plan.     Leandra Cervantes PA-C  [facility]

## 2018-02-12 RX ORDER — DEXTROAMPHETAMINE SACCHARATE, AMPHETAMINE ASPARTATE MONOHYDRATE, DEXTROAMPHETAMINE SULFATE AND AMPHETAMINE SULFATE 3.75; 3.75; 3.75; 3.75 MG/1; MG/1; MG/1; MG/1
15 CAPSULE, EXTENDED RELEASE ORAL EVERY MORNING
Qty: 30 CAPSULE | Refills: 0 | Status: SHIPPED | OUTPATIENT
Start: 2018-02-12 | End: 2018-03-23 | Stop reason: ALTCHOICE

## 2018-03-23 ENCOUNTER — OFFICE VISIT (OUTPATIENT)
Dept: FAMILY MEDICINE | Facility: CLINIC | Age: 33
End: 2018-03-23
Payer: COMMERCIAL

## 2018-03-23 VITALS
RESPIRATION RATE: 15 BRPM | DIASTOLIC BLOOD PRESSURE: 70 MMHG | SYSTOLIC BLOOD PRESSURE: 104 MMHG | HEART RATE: 64 BPM | TEMPERATURE: 98.1 F | BODY MASS INDEX: 22.95 KG/M2 | HEIGHT: 63 IN | WEIGHT: 129.5 LBS

## 2018-03-23 DIAGNOSIS — F90.0 ADHD (ATTENTION DEFICIT HYPERACTIVITY DISORDER), INATTENTIVE TYPE: ICD-10-CM

## 2018-03-23 PROCEDURE — 99213 OFFICE O/P EST LOW 20 MIN: CPT | Performed by: PHYSICIAN ASSISTANT

## 2018-03-23 RX ORDER — LISDEXAMFETAMINE DIMESYLATE 30 MG/1
30 CAPSULE ORAL EVERY MORNING
Qty: 30 CAPSULE | Refills: 0 | Status: SHIPPED | OUTPATIENT
Start: 2018-03-23 | End: 2018-07-23

## 2018-03-23 RX ORDER — DEXTROAMPHETAMINE SACCHARATE, AMPHETAMINE ASPARTATE MONOHYDRATE, DEXTROAMPHETAMINE SULFATE AND AMPHETAMINE SULFATE 3.75; 3.75; 3.75; 3.75 MG/1; MG/1; MG/1; MG/1
15 CAPSULE, EXTENDED RELEASE ORAL EVERY MORNING
Qty: 30 CAPSULE | Refills: 0 | Status: CANCELLED | OUTPATIENT
Start: 2018-03-23

## 2018-03-23 NOTE — NURSING NOTE
"Chief Complaint   Patient presents with     A.D.H.D     discuss medication; insurance problem       Initial /70 (BP Location: Right arm, Patient Position: Chair, Cuff Size: Adult Regular)  Pulse 64  Temp 98.1  F (36.7  C) (Oral)  Resp 15  Ht 5' 3\" (1.6 m)  Wt 129 lb 8 oz (58.7 kg)  Breastfeeding? No  BMI 22.94 kg/m2 Estimated body mass index is 22.94 kg/(m^2) as calculated from the following:    Height as of this encounter: 5' 3\" (1.6 m).    Weight as of this encounter: 129 lb 8 oz (58.7 kg).  Medication Reconciliation: complete   Claire Ortiz CMA (AAMA)      "

## 2018-03-23 NOTE — PROGRESS NOTES
"HPI    SUBJECTIVE:   Alejandra Huertas is a 32 year old female who presents to clinic today for the following health issues:    Medication Followup of Adderall    Taking Medication as prescribed: yes    Side Effects:  Headaches, irritability & loss of appetite    Medication Helping Symptoms:  yes       Problem list and histories reviewed & adjusted, as indicated.  Additional history: Patient has used Ritalin in the past which makes her feel slightly disoriented and causes rash.  Adderall gives her significant HA at night when it is wearing down and weight loss.  She has tried several agents over the last 10 years and has the best response to Vyvanse. She is on spring break now as I let her know that a PA needs to be completed and she says she has some time before school starts back up.       ROS:  Constitutional, HEENT, cardiovascular, pulmonary, gi and gu systems are negative, except as otherwise noted.    OBJECTIVE:     /70 (BP Location: Right arm, Patient Position: Chair, Cuff Size: Adult Regular)  Pulse 64  Temp 98.1  F (36.7  C) (Oral)  Resp 15  Ht 5' 3\" (1.6 m)  Wt 129 lb 8 oz (58.7 kg)  Breastfeeding? No  BMI 22.94 kg/m2  Body mass index is 22.94 kg/(m^2).    Total visit time is 15 Minutes with > 10 Minutes spent in care and consultation regarding ADHD management with medication review and follow up plan.      ASSESSMENT/PLAN:   1. ADHD (attention deficit hyperactivity disorder), inattentive type  - lisdexamfetamine (VYVANSE) 30 MG capsule; Take 1 capsule (30 mg) by mouth every morning  Dispense: 30 capsule; Refill: 0    Follow up in 1 month.  PA completion as needed.     Leandra Cervantes PA-C  [facility]      ROS      Physical Exam      "

## 2018-03-23 NOTE — Clinical Note
Vyvanse has been declined.  Please assist in a PA or appeal process with me. Thanks much. Alexei PATRICK

## 2018-03-23 NOTE — MR AVS SNAPSHOT
"              After Visit Summary   3/23/2018    Alejandra Huertas    MRN: [id number]           Patient Information     Date Of Birth          1985        Visit Information        Provider Department      3/23/2018 8:40 AM Leandra Cervantes PA-C [facility]        Today's Diagnoses     ADHD (attention deficit hyperactivity disorder), inattentive type           Follow-ups after your visit        Who to contact     If you have questions or need follow up information about today's clinic visit or your schedule please contact [facility] directly at [phone].  Normal or non-critical lab and imaging results will be communicated to you by VuCOMPhart, letter or phone within 4 business days after the clinic has received the results. If you do not hear from us within 7 days, please contact the clinic through VuCOMPhart or phone. If you have a critical or abnormal lab result, we will notify you by phone as soon as possible.  Submit refill requests through [employer] or call your pharmacy and they will forward the refill request to us. Please allow 3 business days for your refill to be completed.          Additional Information About Your Visit        MyChart Information     [employer] gives you secure access to your electronic health record. If you see a primary care provider, you can also send messages to your care team and make appointments. If you have questions, please call your primary care clinic.  If you do not have a primary care provider, please call [phone] and they will assist you.        Care EveryWhere ID     This is your Care EveryWhere ID. This could be used by other organizations to access your [place] medical records  [id number]        Your Vitals Were     Pulse Temperature Respirations Height Breastfeeding? BMI (Body Mass Index)    64 98.1  F (36.7  C) (Oral) 15 5' 3\" (1.6 m) No 22.94 kg/m2       Blood Pressure from Last 3 Encounters:   03/23/18 104/70   02/02/18 " 98/66   12/29/17 112/68    Weight from Last 3 Encounters:   03/23/18 129 lb 8 oz (58.7 kg)   02/02/18 129 lb (58.5 kg)   12/29/17 132 lb 11.2 oz (60.2 kg)              Today, you had the following     No orders found for display         Today's Medication Changes          These changes are accurate as of 3/23/18  9:07 AM.  If you have any questions, ask your nurse or doctor.               Start taking these medicines.        Dose/Directions    lisdexamfetamine 30 MG capsule   Commonly known as:  VYVANSE   Used for:  ADHD (attention deficit hyperactivity disorder), inattentive type   Started by:  Leandra Cervantes PA-C        Dose:  30 mg   Take 1 capsule (30 mg) by mouth every morning   Quantity:  30 capsule   Refills:  0         Stop taking these medicines if you haven't already. Please contact your care team if you have questions.     amphetamine-dextroamphetamine 15 MG per 24 hr capsule   Commonly known as:  ADDERALL XR   Stopped by:  Leandra Cervantes PA-C                Where to get your medicines      Some of these will need a paper prescription and others can be bought over the counter.  Ask your nurse if you have questions.     Bring a paper prescription for each of these medications     lisdexamfetamine 30 MG capsule                Primary Care Provider Office Phone # Fax #    Roselia Clancy [phone] [address] [facility] [address]        Equal Access to Services     [facility]: Hadii tito carlo Soharpreet, waaxda luqadaha, qaybta kaalmada adeegyada, bennett blanchard. So [facility] [place] [phone].    ATENCIÓN: Si habla español, tiene a mcmillan disposición servicios gratuitos de asistencia lingüística. Llame al [phone].    We comply with applicable federal civil rights laws and [place] laws. We do not discriminate on the basis of race, color, national origin, age, disability, sex, sexual orientation, or gender identity.             Thank you!     Thank you for choosing [facility]  for your care. Our goal is always to provide you with excellent care. Hearing back from our patients is one way we can continue to improve our services. Please take a few minutes to complete the written survey that you may receive in the mail after your visit with us. Thank you!             Your Updated Medication List - Protect others around you: Learn how to safely use, store and throw away your medicines at www.disposemymeds.org.          This list is accurate as of 3/23/18  9:07 AM.  Always use your most recent med list.                   Brand Name Dispense Instructions for use Diagnosis    fish oil-omega-3 fatty acids 1000 MG capsule      Take 2 capsules by mouth daily (every 24 hours).        lisdexamfetamine 30 MG capsule    VYVANSE    30 capsule    Take 1 capsule (30 mg) by mouth every morning    ADHD (attention deficit hyperactivity disorder), inattentive type       Multi-vitamin Tabs tablet     100 tablet    Take 1 tablet by mouth daily        norethindrone-ethinyl estradiol 1-20 MG-MCG per tablet    BOB FE 1/20    84 tablet    Take 1 tablet by mouth daily    General counseling for prescription of oral contraceptives

## 2018-04-30 DIAGNOSIS — F90.0 ADHD (ATTENTION DEFICIT HYPERACTIVITY DISORDER), INATTENTIVE TYPE: ICD-10-CM

## 2018-04-30 RX ORDER — DEXTROAMPHETAMINE SACCHARATE, AMPHETAMINE ASPARTATE MONOHYDRATE, DEXTROAMPHETAMINE SULFATE AND AMPHETAMINE SULFATE 3.75; 3.75; 3.75; 3.75 MG/1; MG/1; MG/1; MG/1
15 CAPSULE, EXTENDED RELEASE ORAL EVERY MORNING
Qty: 30 CAPSULE | Refills: 0 | Status: SHIPPED | OUTPATIENT
Start: 2018-04-30 | End: 2018-07-23 | Stop reason: ALTCHOICE

## 2018-04-30 NOTE — TELEPHONE ENCOUNTER
Controlled Substance Refill Request for Adderall   Problem List Complete:  Yes    Patient is followed by GAYLE HADLEY for ongoing prescription of stimulants.  All refills should be approved by this provider, or covering partner.    Medication(s): Adderall 20 mg.   Maximum quantity per month: 30  Clinic visit frequency required: Q 3 months 3/23/23 for adhd with dr. Leandra Cervantes (while pcp was out on leave)    Controlled substance agreement on file: Yes       Date(s): 4/25/17  Neuropsych evaluation for ADD completed:  No    Last MNP website verification:  done on 6/1/17   [email or web address]    MNPMP done in pharmacy.   LPU 3/30/18  Dr. Aranda was last prescriber    Arelis Guzman, Pharmacy [facility]     checked in past 6 months?  Yes 4/30/18

## 2018-07-18 ENCOUNTER — TELEPHONE (OUTPATIENT)
Dept: FAMILY MEDICINE | Facility: CLINIC | Age: 33
End: 2018-07-18

## 2018-07-19 NOTE — TELEPHONE ENCOUNTER
LM on VM to call back.     She is technically due for a medication recheck per LOV note. Would recommend a follow visit so that NWD can discuss ADHD.    Roxy DUMONT RN, BSN, PHN  [place] [facility] RN

## 2018-07-23 ENCOUNTER — TELEPHONE (OUTPATIENT)
Dept: FAMILY MEDICINE | Facility: CLINIC | Age: 33
End: 2018-07-23

## 2018-07-23 ENCOUNTER — OFFICE VISIT (OUTPATIENT)
Dept: FAMILY MEDICINE | Facility: CLINIC | Age: 33
End: 2018-07-23
Payer: COMMERCIAL

## 2018-07-23 VITALS
SYSTOLIC BLOOD PRESSURE: 110 MMHG | BODY MASS INDEX: 22.86 KG/M2 | WEIGHT: 129 LBS | HEART RATE: 73 BPM | RESPIRATION RATE: 16 BRPM | HEIGHT: 63 IN | TEMPERATURE: 98.8 F | DIASTOLIC BLOOD PRESSURE: 77 MMHG

## 2018-07-23 DIAGNOSIS — F90.0 ADHD (ATTENTION DEFICIT HYPERACTIVITY DISORDER), INATTENTIVE TYPE: Primary | ICD-10-CM

## 2018-07-23 PROCEDURE — 99213 OFFICE O/P EST LOW 20 MIN: CPT | Performed by: FAMILY MEDICINE

## 2018-07-23 RX ORDER — LISDEXAMFETAMINE DIMESYLATE 30 MG/1
30 CAPSULE ORAL EVERY MORNING
Qty: 30 CAPSULE | Refills: 0 | Status: SHIPPED | OUTPATIENT
Start: 2018-07-23 | End: 2019-06-27

## 2018-07-23 NOTE — TELEPHONE ENCOUNTER
Pt would like to try for a PA.  Please provide reasoning / documentation and forward to PA team at [phone]    Thank you!!    PA NEEDED ON: Renee  INS IS: [employer] PMAP  PHONE # IS: [phone]  ID # IS: [id number]  PCN: [facility]  No Group  PLEASE LET US KNOW WHEN PA IS GRANTED/DENIED.  THANK YOU!  Arelis Guzman, Pharmacy [facility]

## 2018-07-23 NOTE — MR AVS SNAPSHOT
After Visit Summary   7/23/2018    Alejandra Huertas    MRN: [id number]           Patient Information     Date Of Birth          1985        Visit Information        Provider Department      7/23/2018 9:30 AM Roselia Clancy MD [facility]        Today's Diagnoses     ADHD (attention deficit hyperactivity disorder), inattentive type    -  1      Care Instructions    Follow up in 3 months or sooner if needed           Follow-ups after your visit        Follow-up notes from your care team     Return in about 3 months (around 10/23/2018).      Who to contact     If you have questions or need follow up information about today's clinic visit or your schedule please contact [facility] directly at [phone].  Normal or non-critical lab and imaging results will be communicated to you by Spontohart, letter or phone within 4 business days after the clinic has received the results. If you do not hear from us within 7 days, please contact the clinic through [employer] or phone. If you have a critical or abnormal lab result, we will notify you by phone as soon as possible.  Submit refill requests through [employer] or call your pharmacy and they will forward the refill request to us. Please allow 3 business days for your refill to be completed.          Additional Information About Your Visit        MyChart Information     [employer] gives you secure access to your electronic health record. If you see a primary care provider, you can also send messages to your care team and make appointments. If you have questions, please call your primary care clinic.  If you do not have a primary care provider, please call [phone] and they will assist you.        Care EveryWhere ID     This is your Care EveryWhere ID. This could be used by other organizations to access your [place] medical records  [id number]        Your Vitals Were     Pulse Temperature Respirations Height  "Breastfeeding? BMI (Body Mass Index)    73 98.8  F (37.1  C) (Oral) 16 5' 3\" (1.6 m) No 22.85 kg/m2       Blood Pressure from Last 3 Encounters:   07/23/18 110/77   03/23/18 104/70   02/02/18 98/66    Weight from Last 3 Encounters:   07/23/18 129 lb (58.5 kg)   03/23/18 129 lb 8 oz (58.7 kg)   02/02/18 129 lb (58.5 kg)              Today, you had the following     No orders found for display         Today's Medication Changes          These changes are accurate as of 7/23/18  9:53 AM.  If you have any questions, ask your nurse or doctor.               Stop taking these medicines if you haven't already. Please contact your care team if you have questions.     amphetamine-dextroamphetamine 15 MG per 24 hr capsule   Commonly known as:  ADDERALL XR   Stopped by:  Roselia Clancy MD           fish oil-omega-3 fatty acids 1000 MG capsule   Stopped by:  Roselia Clancy MD           Multi-vitamin Tabs tablet   Stopped by:  Roselia Clancy MD                Where to get your medicines      Some of these will need a paper prescription and others can be bought over the counter.  Ask your nurse if you have questions.     Bring a paper prescription for each of these medications     lisdexamfetamine 30 MG capsule                Primary Care Provider Office Phone # Fax #    Roselia Clancy [phone] [address] [facility] [address]        Equal Access to Services     [facility]: Hadii tito neal hadasho Soomaali, waaxda luqadaha, qaybta kaalmada batsheva, bennett patel . So [facility] [phone].    ATENCIÓN: Si habla español, tiene a mcmillan disposición servicios gratuitos de asistencia lingüística. Llame al [phone].    We comply with applicable federal civil rights laws and [place] laws. We do not discriminate on the basis of race, color, national origin, age, disability, sex, sexual orientation, or gender identity.       "      Thank you!     Thank you for choosing [facility]  for your care. Our goal is always to provide you with excellent care. Hearing back from our patients is one way we can continue to improve our services. Please take a few minutes to complete the written survey that you may receive in the mail after your visit with us. Thank you!             Your Updated Medication List - Protect others around you: Learn how to safely use, store and throw away your medicines at www.disposemymeds.org.          This list is accurate as of 7/23/18  9:53 AM.  Always use your most recent med list.                   Brand Name Dispense Instructions for use Diagnosis    lisdexamfetamine 30 MG capsule    VYVANSE    30 capsule    Take 1 capsule (30 mg) by mouth every morning    ADHD (attention deficit hyperactivity disorder), inattentive type       norethindrone-ethinyl estradiol 1-20 MG-MCG per tablet    BOB FE 1/20    84 tablet    Take 1 tablet by mouth daily    General counseling for prescription of oral contraceptives

## 2018-07-23 NOTE — PROGRESS NOTES
"  SUBJECTIVE:   Alejandra Huertas is a 33 year old female who presents to clinic today for the following health issues:      Medication Followup of ADD    Taking Medication as prescribed: NO-has been out of Adderall for @3 weeks    Side Effects:  HA's and irritable    Medication Helping Symptoms:  yes     Reports headaches that last all day. Also has issues with insomnia, decreased appetite and irritable mood.     CURRENT PRESCRIPTIONS:    Adderall XR 15 mg in the morning     MEDICATION BENEFITS:  Controlled symptoms:  Attention span, Distractability, Finishing tasks, Impulse control and Frustration tolerance  Uncontrolled symptoms:  None     MEDICATION SIDE EFFECTS:   Has:  appetite suppression, insomnia and rebound irritability        Problem list and histories reviewed & adjusted, as indicated.  Additional history: as documented    Patient Active Problem List   Diagnosis     ADHD (attention deficit hyperactivity disorder), inattentive type     Adolescent idiopathic scoliosis     Controlled substance agreement signed     Other stimulant dependence, uncomplicated (H)     Past Surgical History:   Procedure Laterality Date     wisdom teeth  2007       Social History   Substance Use Topics     Smoking status: Never Smoker     Smokeless tobacco: Never Used     Alcohol use No     Family History   Problem Relation Age of Onset     Adopted: Yes     Unknown/Adopted Mother      Unknown/Adopted Father            Reviewed and updated as needed this visit by clinical staff  Tobacco  Allergies  Meds       Reviewed and updated as needed this visit by Provider         ROS:  Constitutional, psych systems are negative, except as otherwise noted.    OBJECTIVE:     /77 (BP Location: Left arm, Patient Position: Chair, Cuff Size: Adult Regular)  Pulse 73  Temp 98.8  F (37.1  C) (Oral)  Resp 16  Ht 5' 3\" (1.6 m)  Wt 129 lb (58.5 kg)  Breastfeeding? No  BMI 22.85 kg/m2  Body mass index is 22.85 kg/(m^2).  GENERAL: healthy, " alert and no distress  RESP: lungs clear to auscultation - no rales, rhonchi or wheezes  CV: regular rate and rhythm, normal S1 S2, no S3 or S4, no murmur, click or rub,  PSYCH: mentation appears normal, affect normal/bright    Diagnostic Test Results:  none     ASSESSMENT/PLAN:         1. ADHD (attention deficit hyperactivity disorder), inattentive type  - will restart on vyvanse to see if covered by insurance. If this is not covered will recommend starting strattera since Adderall gives her daily headaches among other unwanted side effects.   - lisdexamfetamine (VYVANSE) 30 MG capsule; Take 1 capsule (30 mg) by mouth every morning  Dispense: 30 capsule; Refill: 0    See Patient Instructions    Roselia Clancy MD  [facility]

## 2018-07-23 NOTE — TELEPHONE ENCOUNTER
Please initiate PA.  Reason: Adderall causing daily headaches, irritability decreased appetite.     NWD

## 2018-07-24 NOTE — TELEPHONE ENCOUNTER
Central Prior Authorization Team   Phone: [phone]      PA Initiation    Medication: Vyvanse-Initiated  Insurance Company: [employer]AP - Phone [phone] Fax [phone]  Pharmacy Filling the Rx: [place], [place] - [address]  Filling Pharmacy Phone: [phone]  Filling Pharmacy Fax:    Start Date: 7/24/2018

## 2018-07-25 NOTE — TELEPHONE ENCOUNTER
PRIOR AUTHORIZATION DENIED    Medication: Vyvanse-DENIED    Denial Date: 7/24/2018    Denial Rational: Criteria not met.                Appeal Information:

## 2018-07-26 NOTE — TELEPHONE ENCOUNTER
Please appeal.     Patient has tried ritalin as well in the past and did not tolerate that well.   That would make two medications that she has failed.     Thanks    Dr. Hewitt

## 2018-07-27 NOTE — TELEPHONE ENCOUNTER
Dr. Clancy- see [employer] message below.  Please advise.  Laila Vidal, RN      Kaycee Luong Nurse Prior Auth 17 hours ago (1:40 PM)                 If an appeal is desired we will need to have a letter of medical necessity placed in the chart.  Insurance also is going to want to know why the patient can't take their formulary atomoxetine.     Thanks,   Central PA team. (Routing comment)

## 2018-07-28 ENCOUNTER — TELEPHONE (OUTPATIENT)
Dept: FAMILY MEDICINE | Facility: CLINIC | Age: 33
End: 2018-07-28

## 2018-07-28 NOTE — TELEPHONE ENCOUNTER
Patient needs PA on: vyvanse 30mg   Insurance is: preferredone  ID number is: [id number]  BIN:[id number]  PCN: [id number]  Help desk phone: [phone]    Patient called in on 7-28-18, said she supposes only has coverage from preferredone, not coverage from [facility], and [facility]     Please let us know if PA granted/denied or change to different medication.  Thanks,    Familia Rosales  Pharmacy Technician  [facility]  [phone]

## 2018-07-30 NOTE — TELEPHONE ENCOUNTER
PA Initiation-New insurance    Medication: Vyvanse-Initiated with new insurance  Insurance Company: [employer] Plus PMAP - Phone [phone] Fax [phone]  Pharmacy Filling the Rx: [place], [place] - [address]  Filling Pharmacy Phone: [phone]  Filling Pharmacy Fax:    Start Date: 7/24/2018

## 2018-07-30 NOTE — TELEPHONE ENCOUNTER
Pt has primary insurance that we did not know about at first.  Please do PA to Danielle Plata    PA NEEDED ON: Vyvanse 30mg  INS IS: [employer]  Bin: [id number]  PHONE # IS: [phone]  ID # IS: [id number]  No group  Pcn: [id number]    PLEASE LET US KNOW WHEN PA IS GRANTED/DENIED.  THANK YOU!  Arelis Guzman, Pharmacy [facility]

## 2018-07-31 NOTE — TELEPHONE ENCOUNTER
Prior Authorization Approval    Authorization Effective Date: 7/30/2018  Authorization Expiration Date: 7/30/2019  Medication: Vyvanse-APPROVED  Approved Dose/Quantity:   Reference #:     Insurance Company: [employer] - Phone [phone] Fax [phone]  Expected CoPay:       CoPay Card Available:      Foundation Assistance Needed:    Which Pharmacy is filling the prescription (Not needed for infusion/clinic administered): [facility] [place], [place] - [address] [facility]  Pharmacy Notified: Yes  Patient Notified: No    Pharmacy will notify patient when medication is ready.

## 2018-10-04 ENCOUNTER — OFFICE VISIT (OUTPATIENT)
Dept: FAMILY MEDICINE | Facility: CLINIC | Age: 33
End: 2018-10-04
Payer: COMMERCIAL

## 2018-10-04 VITALS
DIASTOLIC BLOOD PRESSURE: 77 MMHG | BODY MASS INDEX: 22.54 KG/M2 | OXYGEN SATURATION: 100 % | SYSTOLIC BLOOD PRESSURE: 109 MMHG | WEIGHT: 127.2 LBS | TEMPERATURE: 97.5 F | HEART RATE: 65 BPM | RESPIRATION RATE: 16 BRPM | HEIGHT: 63 IN

## 2018-10-04 DIAGNOSIS — Z00.00 ROUTINE GENERAL MEDICAL EXAMINATION AT A HEALTH CARE FACILITY: Primary | ICD-10-CM

## 2018-10-04 DIAGNOSIS — F90.0 ADHD (ATTENTION DEFICIT HYPERACTIVITY DISORDER), INATTENTIVE TYPE: ICD-10-CM

## 2018-10-04 DIAGNOSIS — Z30.09 GENERAL COUNSELING FOR PRESCRIPTION OF ORAL CONTRACEPTIVES: ICD-10-CM

## 2018-10-04 LAB
ANION GAP SERPL CALCULATED.3IONS-SCNC: 9 MMOL/L (ref 3–14)
BUN SERPL-MCNC: 12 MG/DL (ref 7–30)
CALCIUM SERPL-MCNC: 9 MG/DL (ref 8.5–10.1)
CHLORIDE SERPL-SCNC: 104 MMOL/L (ref 94–109)
CO2 SERPL-SCNC: 26 MMOL/L (ref 20–32)
CREAT SERPL-MCNC: 0.88 MG/DL (ref 0.52–1.04)
ERYTHROCYTE [DISTWIDTH] IN BLOOD BY AUTOMATED COUNT: 11.8 % (ref 10–15)
GFR SERPL CREATININE-BSD FRML MDRD: 75 ML/MIN/1.7M2
GLUCOSE SERPL-MCNC: 96 MG/DL (ref 70–99)
HCT VFR BLD AUTO: 43.7 % (ref 35–47)
HGB BLD-MCNC: 14.5 G/DL (ref 11.7–15.7)
MCH RBC QN AUTO: 31 PG (ref 26.5–33)
MCHC RBC AUTO-ENTMCNC: 33.2 G/DL (ref 31.5–36.5)
MCV RBC AUTO: 93 FL (ref 78–100)
PLATELET # BLD AUTO: 263 10E9/L (ref 150–450)
POTASSIUM SERPL-SCNC: 4.1 MMOL/L (ref 3.4–5.3)
RBC # BLD AUTO: 4.68 10E12/L (ref 3.8–5.2)
SODIUM SERPL-SCNC: 139 MMOL/L (ref 133–144)
WBC # BLD AUTO: 6.8 10E9/L (ref 4–11)

## 2018-10-04 PROCEDURE — 80307 DRUG TEST PRSMV CHEM ANLYZR: CPT | Mod: 90 | Performed by: FAMILY MEDICINE

## 2018-10-04 PROCEDURE — 99213 OFFICE O/P EST LOW 20 MIN: CPT | Mod: 25 | Performed by: FAMILY MEDICINE

## 2018-10-04 PROCEDURE — 36415 COLL VENOUS BLD VENIPUNCTURE: CPT | Performed by: FAMILY MEDICINE

## 2018-10-04 PROCEDURE — 85027 COMPLETE CBC AUTOMATED: CPT | Performed by: FAMILY MEDICINE

## 2018-10-04 PROCEDURE — 80048 BASIC METABOLIC PNL TOTAL CA: CPT | Performed by: FAMILY MEDICINE

## 2018-10-04 PROCEDURE — 99000 SPECIMEN HANDLING OFFICE-LAB: CPT | Performed by: FAMILY MEDICINE

## 2018-10-04 PROCEDURE — 99395 PREV VISIT EST AGE 18-39: CPT | Performed by: FAMILY MEDICINE

## 2018-10-04 RX ORDER — LISDEXAMFETAMINE DIMESYLATE 30 MG/1
30 CAPSULE ORAL DAILY
Qty: 30 CAPSULE | Refills: 0 | Status: SHIPPED | OUTPATIENT
Start: 2018-11-04 | End: 2019-03-06

## 2018-10-04 RX ORDER — LISDEXAMFETAMINE DIMESYLATE 30 MG/1
30 CAPSULE ORAL DAILY
Qty: 30 CAPSULE | Refills: 0 | Status: SHIPPED | OUTPATIENT
Start: 2018-10-04 | End: 2019-03-06

## 2018-10-04 RX ORDER — NORETHINDRONE ACETATE AND ETHINYL ESTRADIOL 1MG-20(21)
1 KIT ORAL DAILY
Qty: 84 TABLET | Refills: 3 | Status: SHIPPED | OUTPATIENT
Start: 2018-10-04 | End: 2019-03-06

## 2018-10-04 RX ORDER — LISDEXAMFETAMINE DIMESYLATE 30 MG/1
30 CAPSULE ORAL DAILY
Qty: 30 CAPSULE | Refills: 0 | Status: SHIPPED | OUTPATIENT
Start: 2018-12-05 | End: 2019-03-06

## 2018-10-04 RX ORDER — LISDEXAMFETAMINE DIMESYLATE 30 MG/1
30 CAPSULE ORAL EVERY MORNING
Qty: 30 CAPSULE | Refills: 0 | Status: CANCELLED | OUTPATIENT
Start: 2018-10-04

## 2018-10-04 NOTE — PROGRESS NOTES
SUBJECTIVE:   CC: Alejandra Huertas is an 33 year old woman who presents for preventive health visit.     Physical   Annual:     Getting at least 3 servings of Calcium per day:  Yes    Bi-annual eye exam:  Yes    Dental care twice a year:  Yes    Sleep apnea or symptoms of sleep apnea:  None    Diet:  Regular (no restrictions)    Frequency of exercise:  4-5 days/week    Duration of exercise:  45-60 minutes    Taking medications regularly:  Yes    Medication side effects:  None    Additional concerns today:  No    Other:  1. ADHD- patient doing well on vyvanse. Reports all symptoms are well controlled.   Denies any medication side effects.     2. Birth control- needs refill     Today's PHQ-2 Score:   PHQ-2 ( 1999 [employer]) 10/3/2018   Q1: Little interest or pleasure in doing things 0   Q2: Feeling down, depressed or hopeless 0   PHQ-2 Score 0   Q1: Little interest or pleasure in doing things Not at all   Q2: Feeling down, depressed or hopeless Not at all   PHQ-2 Score 0       Abuse: Current or Past(Physical, Sexual or Emotional)- No  Do you feel safe in your environment - Yes    Social History   Substance Use Topics     Smoking status: Never Smoker     Smokeless tobacco: Never Used     Alcohol use No     Alcohol Use 10/3/2018   If you drink alcohol do you typically have greater than 3 drinks per day OR greater than 7 drinks per week? No   No flowsheet data found.    Reviewed orders with patient.  Reviewed health maintenance and updated orders accordingly - Yes  Labs reviewed in EPIC    Mammogram not appropriate for this patient based on age.    Pertinent mammograms are reviewed under the imaging tab.  History of abnormal Pap smear: NO - age 30- 65 PAP every 3 years recommended     Reviewed and updated as needed this visit by clinical staff         Reviewed and updated as needed this visit by Provider            Review of Systems  CONSTITUTIONAL: NEGATIVE for fever, chills, change in weight  INTEGUMENTARU/SKIN:  NEGATIVE for worrisome rashes, moles or lesions  EYES: NEGATIVE for vision changes or irritation  ENT: NEGATIVE for ear, mouth and throat problems  RESP: NEGATIVE for significant cough or SOB  BREAST: NEGATIVE for masses, tenderness or discharge  CV: NEGATIVE for chest pain, palpitations or peripheral edema  GI: NEGATIVE for nausea, abdominal pain, heartburn, or change in bowel habits  : NEGATIVE for unusual urinary or vaginal symptoms. Periods are regular.  MUSCULOSKELETAL: NEGATIVE for significant arthralgias or myalgia  NEURO: NEGATIVE for weakness, dizziness or paresthesias  ENDOCRINE: NEGATIVE for temperature intolerance, skin/hair changes  PSYCHIATRIC: NEGATIVE for changes in mood or affect     OBJECTIVE:   There were no vitals taken for this visit.  Physical Exam  GENERAL: healthy, alert and no distress  EYES: Eyes grossly normal to inspection, PERRL and conjunctivae and sclerae normal  HENT: ear canals and TM's normal, nose and mouth without ulcers or lesions  NECK: no adenopathy, no asymmetry, masses, or scars and thyroid normal to palpation  RESP: lungs clear to auscultation - no rales, rhonchi or wheezes  BREAST: normal without masses, tenderness or nipple discharge and no palpable axillary masses or adenopathy  CV: regular rate and rhythm, normal S1 S2, no S3 or S4, no murmur, click or rub, no peripheral edema and peripheral pulses strong  ABDOMEN: soft, nontender, no hepatosplenomegaly, no masses and bowel sounds normal  MS: no gross musculoskeletal defects noted, no edema  SKIN: no suspicious lesions or rashes  NEURO: Normal strength and tone, mentation intact and speech normal  PSYCH: mentation appears normal, affect normal/bright    Diagnostic Test Results:  none     ASSESSMENT/PLAN:   1. Routine general medical examination at a health care facility  - CBC with platelets  - Basic metabolic panel    2. ADHD (attention deficit hyperactivity disorder), inattentive type  - 3 month supply given. RTC in  "3 months   - lisdexamfetamine (VYVANSE) 30 MG capsule; Take 1 capsule (30 mg) by mouth daily  Dispense: 30 capsule; Refill: 0  - lisdexamfetamine (VYVANSE) 30 MG capsule; Take 1 capsule (30 mg) by mouth daily  Dispense: 30 capsule; Refill: 0  - lisdexamfetamine (VYVANSE) 30 MG capsule; Take 1 capsule (30 mg) by mouth daily  Dispense: 30 capsule; Refill: 0  - Drug  Screen Comprehensive , Urine with Reported Meds (Med[facility]) (Pain Care Package)    3. General counseling for prescription of oral contraceptives  - norethindrone-ethinyl estradiol (BOB FE 1/20) 1-20 MG-MCG per tablet; Take 1 tablet by mouth daily  Dispense: 84 tablet; Refill: 3    COUNSELING:  Reviewed preventive health counseling, as reflected in patient instructions       Regular exercise       Healthy diet/nutrition    BP Readings from Last 1 Encounters:   07/23/18 110/77     Estimated body mass index is 22.85 kg/(m^2) as calculated from the following:    Height as of 7/23/18: 5' 3\" (1.6 m).    Weight as of 7/23/18: 129 lb (58.5 kg).           reports that she has never smoked. She has never used smokeless tobacco.      Counseling Resources:  ATP IV Guidelines  Pooled Cohorts Equation Calculator  Breast Cancer Risk Calculator  FRAX Risk Assessment  ICSI Preventive Guidelines  Dietary Guidelines for Americans, 2010  USDA's MyPlate  ASA Prophylaxis  Lung CA Screening    Roselia Clancy MD  [facility]  Answers for HPI/ROS submitted by the patient on 10/3/2018   PHQ-2 Score: 0    "

## 2018-10-04 NOTE — PATIENT INSTRUCTIONS
Follow up in 3 months or sooner if needed   Prevention Guidelines, Women Ages 18 to 39  Screening tests and vaccines are an important part of managing your health. A screening test is done to find possible disorders or diseases in people who don't have any symptoms. The goal is to find a disease early so lifestyle changes can be made and you can be watched more closely to reduce the risk of disease, or to detect it early enough to treat it most effectively. Screening tests are not considered diagnostic, but are used to determine if more testing is needed. Health counseling is essential, too. Below are guidelines for these, for women ages 18 to 39. Talk with your healthcare provider to make sure you re up-to-date on what you need.  Screening Who needs it How often   Alcohol misuse All women in this age group At routine exams   Blood pressure All women in this age group Yearly checkup if your blood pressure is normal  Normal blood pressure is less than 120/80 mm Hg  If your blood pressure reading is higher than normal, follow the advice of your healthcare provider   Breast cancer All women in this age group should talk with their healthcare providers about the need for clinical breast exams (CBE)1 Clinical breast exam every 3 years1   Cervical cancer Women ages 21 and older Women between ages 21 and 29 should have a Pap test every 3 years; women between ages 30 and 65 are advised to have a Pap test plus an HPV test every 5 years   Chlamydia Sexually active women ages 24 and younger, and women at increased risk for infection Every 3 years if you're at risk or have symptoms   Depression All women in this age group At routine exams   Type 2 diabates, prediabetes All women with no symptoms who are overweight or obese and have 1 or more other risk factors for diabetes At least every 3 years. Also, testing for diabetes during pregnancy after the 24th week.    Type 2 diabetes, prediabetes All women diagnosed with  gestational diabetes Lifelong testing every 3 years   Type 2 diabetes All women with prediabetes Every year   Gonorrhea Sexually active women at increased risk for infection At routine exams   Hepatitis C Anyone at increased risk At routine exams   HIV All women should be tested at least once for HIV between the ages of 13 and 64 At routine exams. Those with risk factors for HIV should be tested at least annually.    Obesity All women in this age group At routine exams   Syphilis Women at increased risk for infection should talk with their healthcare provider At routine exams   Tuberculosis Women at increased risk for infection should talk with their healthcare provider Ask your healthcare provider   Vision All women in this age group At least 1 complete exam in your 20s, and 2 in your 30s   Vaccine2 Who needs it How often   Chickenpox (varicella) All women in this age group who have no record of this infection or vaccine 2 doses; the second dose should be given 4 to 8 weeks after the first dose   Hepatitis A Women at increased risk for infection should talk with their healthcare provider 2 doses given at least 6 months apart   Hepatitis B Women at increased risk for infection should talk with their healthcare provider 3 doses over 6 months; second dose should be given 1 month after the first dose; the third dose should be given at least 2 months after the second dose and at least 4 months after the first dose   Haemophilus influenzae Type B (HIB) Women at increased risk for infection should talk with their healthcare provider 1 to 3 doses   Human papillomavirus (HPV) All women in this age group up to age 26 3 doses; the second dose should be given 1 to 2 months after the first dose and the third dose given 6 months after the first dose   Influenza (flu) All women in this age group Once a year   Measles, mumps, rubella (MMR) All women in this age group who have no record of these infections or vaccines 1 or 2 doses    Meningococcal Women at increased risk for infection should talk with their healthcare provider 1 or more doses   Pneumococcal conjugate vaccine (PCV13) and pneumococcal polysaccharide vaccine (PPSV23) Women at increased risk for infection should talk with their healthcare provider PCV13: 1 dose ages 19 to 65 (protects against 13 types of pneumococcal bacteria)  PPSV23: 1 to 2 doses through age 64, or 1 dose at 65 or older (protects against 23 types of pneumococcal bacteria)      Tetanus/diphtheria/pertussis (Td/Tdap) booster All women in this age group Td every 10 years, or a one-time dose of Tdap instead of a Td booster after age 18, then Td every 10 years   Counseling Who needs it How often   BRCA gene mutation testing for breast and ovarian cancer susceptibility Women with increased risk for having gene mutation When your risk is known   Breast cancer and chemoprevention Women at high risk for breast cancer When your risk is known   Diet and exercise Women who are overweight or obese When diagnosed, and then at routine exams   Domestic violence Women at the age in which they are able to have children At routine exams   Sexually transmitted infection prevention Women who are sexually active At routine exams   Skin cancer Prevention of skin cancer in fair-skinned adults At routine exams   Use of tobacco and the health effects it can cause All women in this age group Every visit   1According to the ACS, women ages 20 to 39 years should have a clinical breast exam (CBE) as part of their routine health exam every 3 years. Breast self-exams are an option for women starting in their 20s. But the USPSTF does not recommend CBE.  2Those who are 18 years old and not up-to-date on their childhood vaccines should get all appropriate catch-up vaccines recommended by the CDC.  Date Last Reviewed: 10/1/2017    7271-1997 [employer]. [address] [place] [address], [place] [address]. All rights reserved. This information  is not intended as a substitute for professional medical care. Always follow your healthcare professional's instructions.

## 2018-10-04 NOTE — MR AVS SNAPSHOT
After Visit Summary   10/4/2018    Alejandra Huertas    MRN: [id number]           Patient Information     Date Of Birth          1985        Visit Information        Provider Department      10/4/2018 8:30 AM Roselia Clancy MD [facility]        Today's Diagnoses     Routine general medical examination at a health care facility    -  1    ADHD (attention deficit hyperactivity disorder), inattentive type        General counseling for prescription of oral contraceptives          Care Instructions      Follow up in 3 months or sooner if needed   Prevention Guidelines, Women Ages 18 to 39  Screening tests and vaccines are an important part of managing your health. A screening test is done to find possible disorders or diseases in people who don't have any symptoms. The goal is to find a disease early so lifestyle changes can be made and you can be watched more closely to reduce the risk of disease, or to detect it early enough to treat it most effectively. Screening tests are not considered diagnostic, but are used to determine if more testing is needed. Health counseling is essential, too. Below are guidelines for these, for women ages 18 to 39. Talk with your healthcare provider to make sure you re up-to-date on what you need.  Screening Who needs it How often   Alcohol misuse All women in this age group At routine exams   Blood pressure All women in this age group Yearly checkup if your blood pressure is normal  Normal blood pressure is less than 120/80 mm Hg  If your blood pressure reading is higher than normal, follow the advice of your healthcare provider   Breast cancer All women in this age group should talk with their healthcare providers about the need for clinical breast exams (CBE)1 Clinical breast exam every 3 years1   Cervical cancer Women ages 21 and older Women between ages 21 and 29 should have a Pap test every 3 years; women between ages 30 and 65 are  advised to have a Pap test plus an HPV test every 5 years   Chlamydia Sexually active women ages 24 and younger, and women at increased risk for infection Every 3 years if you're at risk or have symptoms   Depression All women in this age group At routine exams   Type 2 diabates, prediabetes All women with no symptoms who are overweight or obese and have 1 or more other risk factors for diabetes At least every 3 years. Also, testing for diabetes during pregnancy after the 24th week.    Type 2 diabetes, prediabetes All women diagnosed with gestational diabetes Lifelong testing every 3 years   Type 2 diabetes All women with prediabetes Every year   Gonorrhea Sexually active women at increased risk for infection At routine exams   Hepatitis C Anyone at increased risk At routine exams   HIV All women should be tested at least once for HIV between the ages of 13 and 64 At routine exams. Those with risk factors for HIV should be tested at least annually.    Obesity All women in this age group At routine exams   Syphilis Women at increased risk for infection should talk with their healthcare provider At routine exams   Tuberculosis Women at increased risk for infection should talk with their healthcare provider Ask your healthcare provider   Vision All women in this age group At least 1 complete exam in your 20s, and 2 in your 30s   Vaccine2 Who needs it How often   Chickenpox (varicella) All women in this age group who have no record of this infection or vaccine 2 doses; the second dose should be given 4 to 8 weeks after the first dose   Hepatitis A Women at increased risk for infection should talk with their healthcare provider 2 doses given at least 6 months apart   Hepatitis B Women at increased risk for infection should talk with their healthcare provider 3 doses over 6 months; second dose should be given 1 month after the first dose; the third dose should be given at least 2 months after the second dose and at least  4 months after the first dose   Haemophilus influenzae Type B (HIB) Women at increased risk for infection should talk with their healthcare provider 1 to 3 doses   Human papillomavirus (HPV) All women in this age group up to age 26 3 doses; the second dose should be given 1 to 2 months after the first dose and the third dose given 6 months after the first dose   Influenza (flu) All women in this age group Once a year   Measles, mumps, rubella (MMR) All women in this age group who have no record of these infections or vaccines 1 or 2 doses   Meningococcal Women at increased risk for infection should talk with their healthcare provider 1 or more doses   Pneumococcal conjugate vaccine (PCV13) and pneumococcal polysaccharide vaccine (PPSV23) Women at increased risk for infection should talk with their healthcare provider PCV13: 1 dose ages 19 to 65 (protects against 13 types of pneumococcal bacteria)  PPSV23: 1 to 2 doses through age 64, or 1 dose at 65 or older (protects against 23 types of pneumococcal bacteria)      Tetanus/diphtheria/pertussis (Td/Tdap) booster All women in this age group Td every 10 years, or a one-time dose of Tdap instead of a Td booster after age 18, then Td every 10 years   Counseling Who needs it How often   BRCA gene mutation testing for breast and ovarian cancer susceptibility Women with increased risk for having gene mutation When your risk is known   Breast cancer and chemoprevention Women at high risk for breast cancer When your risk is known   Diet and exercise Women who are overweight or obese When diagnosed, and then at routine exams   Domestic violence Women at the age in which they are able to have children At routine exams   Sexually transmitted infection prevention Women who are sexually active At routine exams   Skin cancer Prevention of skin cancer in fair-skinned adults At routine exams   Use of tobacco and the health effects it can cause All women in this age group Every visit    1According to the ACS, women ages 20 to 39 years should have a clinical breast exam (CBE) as part of their routine health exam every 3 years. Breast self-exams are an option for women starting in their 20s. But the USPSTF does not recommend CBE.  2Those who are 18 years old and not up-to-date on their childhood vaccines should get all appropriate catch-up vaccines recommended by the CDC.  Date Last Reviewed: 10/1/2017    3827-5470 [employer]. [address] [place] [address]. All rights reserved. This information is not intended as a substitute for professional medical care. Always follow your healthcare professional's instructions.                Follow-ups after your visit        Follow-up notes from your care team     Return in about 3 months (around 1/4/2019).      Who to contact     If you have questions or need follow up information about today's clinic visit or your schedule please contact [facility] directly at [phone].  Normal or non-critical lab and imaging results will be communicated to you by MindMixerhart, letter or phone within 4 business days after the clinic has received the results. If you do not hear from us within 7 days, please contact the clinic through vSocial or phone. If you have a critical or abnormal lab result, we will notify you by phone as soon as possible.  Submit refill requests through vSocial or call your pharmacy and they will forward the refill request to us. Please allow 3 business days for your refill to be completed.          Additional Information About Your Visit        vSocial Information     vSocial gives you secure access to your electronic health record. If you see a primary care provider, you can also send messages to your care team and make appointments. If you have questions, please call your primary care clinic.  If you do not have a primary care provider, please call [phone] and they will assist you.        Care  "EveryWhere ID     This is your Care EveryWhere ID. This could be used by other organizations to access your [facility] medical records  [id number]        Your Vitals Were     Pulse Temperature Respirations Height Pulse Oximetry BMI (Body Mass Index)    65 97.5  F (36.4  C) (Oral) 16 5' 3\" (1.6 m) 100% 22.53 kg/m2       Blood Pressure from Last 3 Encounters:   10/04/18 109/77   07/23/18 110/77   03/23/18 104/70    Weight from Last 3 Encounters:   10/04/18 127 lb 3.2 oz (57.7 kg)   07/23/18 129 lb (58.5 kg)   03/23/18 129 lb 8 oz (58.7 kg)              We Performed the Following     Basic metabolic panel     CBC with platelets     Drug  Screen Comprehensive , Urine with Reported Meds (MedTox) (Pain Care Package)          Today's Medication Changes          These changes are accurate as of 10/4/18  8:38 AM.  If you have any questions, ask your nurse or doctor.               These medicines have changed or have updated prescriptions.        Dose/Directions    * lisdexamfetamine 30 MG capsule   Commonly known as:  VYVANSE   This may have changed:  Another medication with the same name was added. Make sure you understand how and when to take each.   Used for:  ADHD (attention deficit hyperactivity disorder), inattentive type   Changed by:  Roselia Clancy MD        Dose:  30 mg   Take 1 capsule (30 mg) by mouth every morning   Quantity:  30 capsule   Refills:  0       * lisdexamfetamine 30 MG capsule   Commonly known as:  VYVANSE   This may have changed:  You were already taking a medication with the same name, and this prescription was added. Make sure you understand how and when to take each.   Used for:  ADHD (attention deficit hyperactivity disorder), inattentive type   Changed by:  Roselia Clancy MD        Dose:  30 mg   Take 1 capsule (30 mg) by mouth daily   Quantity:  30 capsule   Refills:  0       * lisdexamfetamine 30 MG capsule   Commonly known as:  VYVANSE   This may have changed:  " You were already taking a medication with the same name, and this prescription was added. Make sure you understand how and when to take each.   Used for:  ADHD (attention deficit hyperactivity disorder), inattentive type   Changed by:  Roselia Clancy MD        Dose:  30 mg   Start taking on:  11/4/2018   Take 1 capsule (30 mg) by mouth daily   Quantity:  30 capsule   Refills:  0       * lisdexamfetamine 30 MG capsule   Commonly known as:  VYVANSE   This may have changed:  You were already taking a medication with the same name, and this prescription was added. Make sure you understand how and when to take each.   Used for:  ADHD (attention deficit hyperactivity disorder), inattentive type   Changed by:  Roselia Clancy MD        Dose:  30 mg   Start taking on:  12/5/2018   Take 1 capsule (30 mg) by mouth daily   Quantity:  30 capsule   Refills:  0       * Notice:  This list has 4 medication(s) that are the same as other medications prescribed for you. Read the directions carefully, and ask your doctor or other care provider to review them with you.         Where to get your medicines      These medications were sent to [facility] - [address]   [address] [facility], [place] [address]     Phone:  [phone]     norethindrone-ethinyl estradiol 1-20 MG-MCG per tablet         Some of these will need a paper prescription and others can be bought over the counter.  Ask your nurse if you have questions.     Bring a paper prescription for each of these medications     lisdexamfetamine 30 MG capsule    lisdexamfetamine 30 MG capsule    lisdexamfetamine 30 MG capsule                Primary Care Provider Office Phone # Fax #    Roselia Clancy [phone] [address] [facility] [address]        Equal Access to Services     [facility] TAMERA AH: Diane Osborne, apurva holder, dory marin  bennett hermanchristiano cohnaan ah. So [facility] [phone].    ATENCIÓN: Si habla marisela, tiene a mcmillan disposición servicios gratuitos de asistencia lingüística. Danny al [phone].    We comply with applicable federal civil rights laws and [place] laws. We do not discriminate on the basis of race, color, national origin, age, disability, sex, sexual orientation, or gender identity.            Thank you!     Thank you for choosing [facility]  for your care. Our goal is always to provide you with excellent care. Hearing back from our patients is one way we can continue to improve our services. Please take a few minutes to complete the written survey that you may receive in the mail after your visit with us. Thank you!             Your Updated Medication List - Protect others around you: Learn how to safely use, store and throw away your medicines at www.disposemymeds.org.          This list is accurate as of 10/4/18  8:38 AM.  Always use your most recent med list.                   Brand Name Dispense Instructions for use Diagnosis    * lisdexamfetamine 30 MG capsule    VYVANSE    30 capsule    Take 1 capsule (30 mg) by mouth every morning    ADHD (attention deficit hyperactivity disorder), inattentive type       * lisdexamfetamine 30 MG capsule    VYVANSE    30 capsule    Take 1 capsule (30 mg) by mouth daily    ADHD (attention deficit hyperactivity disorder), inattentive type       * lisdexamfetamine 30 MG capsule   Start taking on:  11/4/2018    VYVANSE    30 capsule    Take 1 capsule (30 mg) by mouth daily    ADHD (attention deficit hyperactivity disorder), inattentive type       * lisdexamfetamine 30 MG capsule   Start taking on:  12/5/2018    VYVANSE    30 capsule    Take 1 capsule (30 mg) by mouth daily    ADHD (attention deficit hyperactivity disorder), inattentive type       norethindrone-ethinyl estradiol 1-20 MG-MCG per tablet    BOB FE 1/20    84 tablet    Take 1  tablet by mouth daily    General counseling for prescription of oral contraceptives       * Notice:  This list has 4 medication(s) that are the same as other medications prescribed for you. Read the directions carefully, and ask your doctor or other care provider to review them with you.

## 2018-10-08 LAB — PAIN DRUG SCR UR W RPTD MEDS: NORMAL

## 2019-02-19 ENCOUNTER — MYC REFILL (OUTPATIENT)
Dept: FAMILY MEDICINE | Facility: CLINIC | Age: 34
End: 2019-02-19

## 2019-02-19 DIAGNOSIS — F90.0 ADHD (ATTENTION DEFICIT HYPERACTIVITY DISORDER), INATTENTIVE TYPE: ICD-10-CM

## 2019-02-19 RX ORDER — LISDEXAMFETAMINE DIMESYLATE 30 MG/1
30 CAPSULE ORAL EVERY MORNING
Qty: 30 CAPSULE | Refills: 0 | Status: CANCELLED | OUTPATIENT
Start: 2019-02-19

## 2019-02-20 PROBLEM — F90.0 ADHD (ATTENTION DEFICIT HYPERACTIVITY DISORDER), INATTENTIVE TYPE: Status: ACTIVE | Noted: 2017-04-25

## 2019-02-20 NOTE — TELEPHONE ENCOUNTER
updated.  No concerns noted.  Due for visit.  Taketake message sent to schedule visit and address refill at visit.

## 2019-02-20 NOTE — TELEPHONE ENCOUNTER
Requested Prescriptions   Pending Prescriptions Disp Refills     lisdexamfetamine (VYVANSE) 30 MG capsule 30 capsule 0     Sig: Take 1 capsule (30 mg) by mouth every morning    There is no refill protocol information for this order        Last Written Prescription Date:  12/5/2018  Last Fill Quantity: 30,  # refills: 0   Last office visit: 10/4/2018 with prescribing provider:  Dr Miguel Ho   Future Office Visit:

## 2019-03-06 ENCOUNTER — OFFICE VISIT (OUTPATIENT)
Dept: FAMILY MEDICINE | Facility: CLINIC | Age: 34
End: 2019-03-06
Payer: COMMERCIAL

## 2019-03-06 VITALS
RESPIRATION RATE: 12 BRPM | SYSTOLIC BLOOD PRESSURE: 126 MMHG | WEIGHT: 128 LBS | HEART RATE: 77 BPM | DIASTOLIC BLOOD PRESSURE: 77 MMHG | TEMPERATURE: 98.1 F | BODY MASS INDEX: 22.67 KG/M2 | OXYGEN SATURATION: 100 %

## 2019-03-06 DIAGNOSIS — F90.0 ADHD (ATTENTION DEFICIT HYPERACTIVITY DISORDER), INATTENTIVE TYPE: Primary | ICD-10-CM

## 2019-03-06 PROCEDURE — 99213 OFFICE O/P EST LOW 20 MIN: CPT | Performed by: FAMILY MEDICINE

## 2019-03-06 RX ORDER — LISDEXAMFETAMINE DIMESYLATE 30 MG/1
30 CAPSULE ORAL DAILY
Qty: 30 CAPSULE | Refills: 0 | Status: SHIPPED | OUTPATIENT
Start: 2019-03-06 | End: 2019-06-27

## 2019-03-06 RX ORDER — LISDEXAMFETAMINE DIMESYLATE 30 MG/1
30 CAPSULE ORAL DAILY
Qty: 30 CAPSULE | Refills: 0 | Status: SHIPPED | OUTPATIENT
Start: 2019-04-06 | End: 2019-09-27

## 2019-03-06 RX ORDER — LISDEXAMFETAMINE DIMESYLATE 30 MG/1
30 CAPSULE ORAL DAILY
Qty: 30 CAPSULE | Refills: 0 | Status: SHIPPED | OUTPATIENT
Start: 2019-05-07 | End: 2019-06-27

## 2019-03-06 NOTE — PROGRESS NOTES
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 She is studying for the [employer] in hopes of starting a masters program soon.   Denies any medication side effects.         Problem list and histories reviewed & adjusted, as indicated.  Additional history: as documented    Patient Active Problem List   Diagnosis     ADHD (attention deficit hyperactivity disorder), inattentive type     Adolescent idiopathic scoliosis     Controlled substance agreement signed     Other stimulant dependence, uncomplicated (H)     Past Surgical History:   Procedure Laterality Date     wisdom teeth  2007       Social History     Tobacco Use     Smoking status: Never Smoker     Smokeless tobacco: Never Used   Substance Use Topics     Alcohol use: No     Family History   Adopted: Yes   Problem Relation Age of Onset     Unknown/Adopted Mother      Unknown/Adopted Father            Reviewed and updated as needed this visit by clinical staff  Tobacco  Allergies  Meds  Med Hx  Surg Hx  Fam Hx  Soc Hx      Reviewed and updated as needed this visit by Provider         ROS:  Constitutional, cardiovascular, gi, psych systems are negative, except as otherwise noted.    OBJECTIVE:     /77 (BP Location: Right arm, Patient Position: Chair, Cuff Size: Adult Regular)   Pulse 77   Temp 98.1  F (36.7  C) (Oral)   Resp 12   Wt 58.1 kg (128 lb)   SpO2 100%   BMI 22.67 kg/m    Body mass index is 22.67 kg/m .  GENERAL: healthy, alert and no distress  RESP: lungs clear to auscultation - no rales, rhonchi or wheezes  CV: regular rate and rhythm, normal S1 S2  ABDOMEN: soft, nontender, and bowel sounds normal  PSYCH: mentation normal, bright mood, judgement  intact     Diagnostic Test Results:  none     ASSESSMENT/PLAN:       1. ADHD (attention deficit hyperactivity disorder), inattentive type  - stable. 3 month supply given. RTC in 3 months   - lisdexamfetamine (VYVANSE) 30 MG capsule; Take 1 capsule (30 mg) by mouth daily  Dispense: 30 capsule; Refill: 0  - lisdexamfetamine (VYVANSE) 30 MG capsule; Take 1 capsule (30 mg) by mouth daily  Dispense: 30 capsule; Refill: 0  - lisdexamfetamine (VYVANSE) 30 MG capsule; Take 1 capsule (30 mg) by mouth daily  Dispense: 30 capsule; Refill: 0    See Patient Instructions    Roselia Clancy MD  [facility]

## 2019-06-27 ENCOUNTER — OFFICE VISIT (OUTPATIENT)
Dept: OPTOMETRY | Facility: CLINIC | Age: 34
End: 2019-06-27
Payer: COMMERCIAL

## 2019-06-27 DIAGNOSIS — H52.13 MYOPIA OF BOTH EYES WITH ASTIGMATISM: Primary | ICD-10-CM

## 2019-06-27 DIAGNOSIS — H52.203 MYOPIA OF BOTH EYES WITH ASTIGMATISM: Primary | ICD-10-CM

## 2019-06-27 DIAGNOSIS — Z98.890 S/P LASIK SURGERY: ICD-10-CM

## 2019-06-27 DIAGNOSIS — H04.129 DRY EYE: ICD-10-CM

## 2019-06-27 PROCEDURE — 92015 DETERMINE REFRACTIVE STATE: CPT | Performed by: OPTOMETRIST

## 2019-06-27 PROCEDURE — 92310 CONTACT LENS FITTING OU: CPT | Performed by: OPTOMETRIST

## 2019-06-27 PROCEDURE — 92004 COMPRE OPH EXAM NEW PT 1/>: CPT | Performed by: OPTOMETRIST

## 2019-06-27 ASSESSMENT — VISUAL ACUITY
OS_SC+: -1
OD_SC: 20/50
OD_CC: 20/20
OD_CC: 20/25
CORRECTION_TYPE: GLASSES
OS_SC: 20/50
METHOD: SNELLEN - LINEAR
OS_CC: 20/20-1
OS_CC: 20/20
OD_SC+: -1
OD_CC+: -3

## 2019-06-27 ASSESSMENT — CONF VISUAL FIELD
OD_NORMAL: 1
METHOD: COUNTING FINGERS
OS_NORMAL: 1

## 2019-06-27 ASSESSMENT — REFRACTION_WEARINGRX
OS_CYLINDER: +0.25
OD_CYLINDER: +0.25
OS_SPHERE: -1.25
OD_SPHERE: -1.25
OD_AXIS: 100
OS_AXIS: 084
SPECS_TYPE: SVL

## 2019-06-27 ASSESSMENT — KERATOMETRY
OD_AXISANGLE_DEGREES: 68
OS_K1POWER_DIOPTERS: 41.37
OS_AXISANGLE2_DEGREES: 004
OS_K2POWER_DIOPTERS: 41.87
OS_AXISANGLE_DEGREES: 94
OD_K2POWER_DIOPTERS: 42.25
METHOD_AUTO_MANUAL: AUTOMATED
OD_AXISANGLE2_DEGREES: 158
OD_K1POWER_DIOPTERS: 41.75

## 2019-06-27 ASSESSMENT — REFRACTION_CURRENTRX
OD_BASECURVE: 8.8
OD_SPHERE: -1.00
OD_SPHERE: -1.00
OD_BRAND: J&J ACUVUE OASYS BC 8.8, D 14.0
OD_DIAMETER: 14.0
OS_SPHERE: -1.00
OD_BRAND: ACUVUE OASYS
OS_BRAND: ACUVUE OASYS
OS_SPHERE: -1.25
OS_BASECURVE: 8.8
OS_DIAMETER: 14.0
OS_BRAND: J&J ACUVUE OASYS BC 8.8, D 14.0

## 2019-06-27 ASSESSMENT — SLIT LAMP EXAM - LIDS
COMMENTS: NORMAL
COMMENTS: NORMAL

## 2019-06-27 ASSESSMENT — REFRACTION_MANIFEST
OD_AXIS: 090
OD_SPHERE: -1.00
OD_CYLINDER: +0.25
OD_CYLINDER: SPHERE
OS_CYLINDER: SPHERE
OS_CYLINDER: +0.25
OS_SPHERE: -1.25
METHOD_AUTOREFRACTION: 1
OS_SPHERE: -1.25
OD_SPHERE: -1.25
OS_AXIS: 085

## 2019-06-27 ASSESSMENT — CUP TO DISC RATIO
OS_RATIO: 0.45
OD_RATIO: 0.45

## 2019-06-27 ASSESSMENT — TONOMETRY
IOP_METHOD: APPLANATION
OS_IOP_MMHG: 11
OD_IOP_MMHG: 11

## 2019-06-27 NOTE — LETTER
6/27/2019         RE: Alejandra Huertas  [address]  [place] [address]        Dear Colleague,    Thank you for referring your patient, Alejandra Huertas, to the [facility]. Please see a copy of my visit note below.    Chief Complaint   Patient presents with     Annual Eye Exam     Patient reports she had Lasik in 2008 (around) somewhere in [place]  ADRIAN:2y  Got glasses and contacts 5-6 years ago    Wears Oasys 8.8 14.4    -1.00 ou    Feels like distance is not as clear in her glasses (they are older) as it is with contacts    Remembered being checked for macular degeneration 5-6 years ago and was told they would monitor    Previous contact lens wearer? Yes: Acuvue Oasys  Comfort of contact lenses :Good  Mild lens dryness with overnight shifts  Satisfied with current lenses: Yes      Last Eye Exam: 2 years  Dilated Previously: Yes    What are you currently using to see?  glasses, contacts    Distance Vision Acuity: Noticed gradual change in left eye    Near Vision Acuity: Satisfied with vision while reading  with glasses    Eye Comfort: good  Do you use eye drops? 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       OBJECTIVE: See Ophthalmology exam    ASSESSMENT:    ICD-10-CM    1. Myopia of both eyes with astigmatism H52.13     H52.203    2. S/P LASIK surgery Z98.890    3. Dry eye H04.129         PLAN:   Discussed risk versus benefit of lasik enhancement   Updated prescription   Artificial tears / blink contacts     Natalie Guerra OD                   Again, thank you for allowing me to participate in the care of your patient.        Sincerely,        Natalie Guerra, OD

## 2019-06-27 NOTE — PROGRESS NOTES
Chief Complaint   Patient presents with     Annual Eye Exam     Patient reports she had Lasik in 2008 (around) somewhere in [place]  ADRIAN:2y  Got glasses and contacts 5-6 years ago    Wears Oasys 8.8 14.4    -1.00 ou    Feels like distance is not as clear in her glasses (they are older) as it is with contacts    Remembered being checked for macular degeneration 5-6 years ago and was told they would monitor    Previous contact lens wearer? Yes: Acuvue Oasys  Comfort of contact lenses :Good  Mild lens dryness with overnight shifts  Satisfied with current lenses: Yes      Last Eye Exam: 2 years  Dilated Previously: Yes    What are you currently using to see?  glasses, contacts    Distance Vision Acuity: Noticed gradual change in left eye    Near Vision Acuity: Satisfied with vision while reading  with glasses    Eye Comfort: good  Do you use eye drops? 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       OBJECTIVE: See Ophthalmology exam    ASSESSMENT:    ICD-10-CM    1. Myopia of both eyes with astigmatism H52.13     H52.203    2. S/P LASIK surgery Z98.890    3. Dry eye H04.129         PLAN:   Discussed risk versus benefit of lasik enhancement   Updated prescription   Artificial tears / blink contacts     Natalie Guerra OD

## 2019-06-27 NOTE — PATIENT INSTRUCTIONS
Once your contact lens prescription is finalized and you are not having any problems with the trials or current lenses you can order your supply of lenses.   You can order your contact lenses online at [email or web address].  Click on services at the top of the page, then eye care and you will see the link to order contact lenses.  You can also order contact lenses at [phone]. There is no shipping fee if you order an annual supply otherwise  be sure to let them know which office you would like to  the lenses, [place] [phone].

## 2019-07-19 ENCOUNTER — MYC MEDICAL ADVICE (OUTPATIENT)
Dept: FAMILY MEDICINE | Facility: CLINIC | Age: 34
End: 2019-07-19

## 2019-08-13 ENCOUNTER — TELEPHONE (OUTPATIENT)
Dept: FAMILY MEDICINE | Facility: CLINIC | Age: 34
End: 2019-08-13

## 2019-08-13 NOTE — TELEPHONE ENCOUNTER
Prior Authorization Retail Medication Request    Medication/Dose: Vyvanse 30mg  ICD code (if different than what is on RX):    Previously Tried and Failed:    Rationale:      Insurance Name:  [employer]  Insurance ID:  [id number]      Pharmacy Information (if different than what is on RX)  Name:  [facility]  Phone:  [phone]    Prior authorization required, call [phone].    Thank you,  Stephanie Campa [facility] [place]

## 2019-08-21 ENCOUNTER — MYC MEDICAL ADVICE (OUTPATIENT)
Dept: FAMILY MEDICINE | Facility: CLINIC | Age: 34
End: 2019-08-21

## 2019-08-21 NOTE — TELEPHONE ENCOUNTER
Central Prior Authorization Team   Phone: [phone]    PA Initiation    Medication: Vyvanse 30mg  Insurance Company: [employer] - Phone [phone] Fax [phone]  Pharmacy Filling the Rx: [facility] - [address]   Filling Pharmacy Phone: [phone]  Filling Pharmacy Fax:    Start Date: 8/21/2019    Alejandra Huertas (Fajardo: [id number])

## 2019-08-22 NOTE — TELEPHONE ENCOUNTER
Central Prior Authorization Team   Phone: [phone]    Prior Authorization Approval    Authorization Effective Date: 8/22/2019  Authorization Expiration Date: 8/21/2020  Medication: Vyvanse 30mg  Approved Dose/Quantity:   Reference #: [id number]   Insurance Company: [employer] - Phone [phone] Fax [phone]  Expected CoPay:       CoPay Card Available:      Foundation Assistance Needed:    Which Pharmacy is filling the prescription (Not needed for infusion/clinic administered): [facility] [place], [place] - [address] [place] [address]   Pharmacy Notified: Yes  Patient Notified: Yes  **Instructed pharmacy to notify patient when script is ready to /ship.**

## 2019-08-28 ENCOUNTER — TELEPHONE (OUTPATIENT)
Dept: FAMILY MEDICINE | Facility: CLINIC | Age: 34
End: 2019-08-28

## 2019-09-27 ENCOUNTER — OFFICE VISIT (OUTPATIENT)
Dept: FAMILY MEDICINE | Facility: CLINIC | Age: 34
End: 2019-09-27
Payer: COMMERCIAL

## 2019-09-27 VITALS
OXYGEN SATURATION: 100 % | SYSTOLIC BLOOD PRESSURE: 116 MMHG | DIASTOLIC BLOOD PRESSURE: 78 MMHG | TEMPERATURE: 98.5 F | HEART RATE: 75 BPM | WEIGHT: 128.4 LBS | HEIGHT: 63 IN | BODY MASS INDEX: 22.75 KG/M2 | RESPIRATION RATE: 14 BRPM

## 2019-09-27 DIAGNOSIS — F90.0 ADHD (ATTENTION DEFICIT HYPERACTIVITY DISORDER), INATTENTIVE TYPE: Primary | ICD-10-CM

## 2019-09-27 PROCEDURE — 99000 SPECIMEN HANDLING OFFICE-LAB: CPT | Performed by: FAMILY MEDICINE

## 2019-09-27 PROCEDURE — 80307 DRUG TEST PRSMV CHEM ANLYZR: CPT | Mod: 90 | Performed by: FAMILY MEDICINE

## 2019-09-27 PROCEDURE — 99213 OFFICE O/P EST LOW 20 MIN: CPT | Performed by: FAMILY MEDICINE

## 2019-09-27 RX ORDER — LISDEXAMFETAMINE DIMESYLATE 30 MG/1
30 CAPSULE ORAL DAILY
Qty: 30 CAPSULE | Refills: 0 | Status: SHIPPED | OUTPATIENT
Start: 2019-09-27 | End: 2019-11-07

## 2019-09-27 ASSESSMENT — MIFFLIN-ST. JEOR: SCORE: 1261.55

## 2019-09-27 NOTE — PROGRESS NOTES
"Subjective     Alejandra Huertas is a 34 year old female who presents to clinic today for the following health issues:    HPI   Medication Followup of Vyvanse    Taking Medication as prescribed: yes    Side Effects:  None    Medication Helping Symptoms:  yes     Patient took a break from using Vyvanse over the summer as she was not in school.  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   Denies any medication side effects.   Plans on enrolling in FNP program at [facility] in May.         Patient Active Problem List   Diagnosis     ADHD (attention deficit hyperactivity disorder), inattentive type     Adolescent idiopathic scoliosis     Controlled substance agreement signed     Other stimulant dependence, uncomplicated (H)     Myopia of both eyes with astigmatism     S/P LASIK surgery     Dry eye     Past Surgical History:   Procedure Laterality Date     LASIK       wisdom teeth  2007       Social History     Tobacco Use     Smoking status: Never Smoker     Smokeless tobacco: Never Used   Substance Use Topics     Alcohol use: No     Family History   Adopted: Yes   Problem Relation Age of Onset     Unknown/Adopted Mother      Unknown/Adopted Father      Glaucoma No family hx of      Macular Degeneration No family hx of            Reviewed and updated as needed this visit by Provider  Tobacco  Allergies  Meds  Problems  Med Hx  Surg Hx  Fam Hx         Review of Systems   ROS COMP: Constitutional, cardiovascular, pulmonary, gi, neuro, psych systems are negative, except as otherwise noted.      Objective    /78 (BP Location: Right arm, Patient Position: Chair, Cuff Size: Adult Regular)   Pulse 75   Temp 98.5  F (36.9  C) (Oral)   Resp 14   Ht 1.6 m (5' 3\")   Wt 58.2 kg (128 lb 6.4 oz)   SpO2 100%   BMI 22.75 kg/m    Body mass index is 22.75 kg/m .  Physical Exam   GENERAL: healthy, alert and no distress  RESP: lungs clear to auscultation - no rales, rhonchi or " wheezes  CV: regular rate and rhythm, normal S1 S2, no S3 or S4, no murmur, click or rub, no peripheral edema and peripheral pulses strong  PSYCH: mentation appears normal, affect normal/bright    Diagnostic Test Results:  Labs reviewed in Epic  none         Assessment & Plan     1. ADHD (attention deficit hyperactivity disorder), inattentive type  - stable. Will continue with current dose. Will send Rx via e-prescribe.   - lisdexamfetamine (VYVANSE) 30 MG capsule; Take 1 capsule (30 mg) by mouth daily  Dispense: 30 capsule; Refill: 0  - Pain Drug Scr UR W Rptd Meds       See Patient Instructions    Return in about 5 months (around 2/27/2020) for Physical Exam, medication recheck.    Roselia Clancy MD  [facility]

## 2019-10-02 LAB — PAIN DRUG SCR UR W RPTD MEDS: NORMAL

## 2019-11-07 ENCOUNTER — MYC REFILL (OUTPATIENT)
Dept: FAMILY MEDICINE | Facility: CLINIC | Age: 34
End: 2019-11-07

## 2019-11-07 DIAGNOSIS — F90.0 ADHD (ATTENTION DEFICIT HYPERACTIVITY DISORDER), INATTENTIVE TYPE: ICD-10-CM

## 2019-11-08 RX ORDER — LISDEXAMFETAMINE DIMESYLATE 30 MG/1
30 CAPSULE ORAL DAILY
Qty: 30 CAPSULE | Refills: 0 | Status: SHIPPED | OUTPATIENT
Start: 2019-11-08 | End: 2019-12-09

## 2019-11-08 NOTE — TELEPHONE ENCOUNTER
updated.  No concerns noted.  Not PSO med.  Sent to provider.  Please advise.  Laila Vidal RN      9/27/2019  [facility]        Assessment & Plan         1. ADHD (attention deficit hyperactivity disorder), inattentive type  - stable. Will continue with current dose. Will send Rx via e-prescribe.   - lisdexamfetamine (VYVANSE) 30 MG capsule; Take 1 capsule (30 mg) by mouth daily  Dispense: 30 capsule; Refill: 0  - Pain Drug Scr UR W Rptd Meds        See Patient Instructions     Return in about 5 months (around 2/27/2020) for Physical Exam, medication recheck.     Roselia Clancy MD  [facility]            Other Notes      All notes   Instructions         Return in about 5 months (around 2/27/2020) for Physical Exam, medication recheck.   Follow up in February for medication recheck and physical exam

## 2019-12-09 ENCOUNTER — MYC REFILL (OUTPATIENT)
Dept: FAMILY MEDICINE | Facility: CLINIC | Age: 34
End: 2019-12-09

## 2019-12-09 DIAGNOSIS — F90.0 ADHD (ATTENTION DEFICIT HYPERACTIVITY DISORDER), INATTENTIVE TYPE: ICD-10-CM

## 2019-12-10 RX ORDER — LISDEXAMFETAMINE DIMESYLATE 30 MG/1
30 CAPSULE ORAL DAILY
Qty: 30 CAPSULE | Refills: 0 | Status: SHIPPED | OUTPATIENT
Start: 2019-12-10

## 2019-12-10 NOTE — TELEPHONE ENCOUNTER
Controlled Substance Refill Request for   Pending Prescriptions:                       Disp   Refills    lisdexamfetamine (VYVANSE) 30 MG capsule  30 cap*0            Sig: Take 1 capsule (30 mg) by mouth daily    Last refill: 11/8/19    Last clinic visit: 9/27/19     Clinic visit frequency required: Q 6  months  Next appt: --    Controlled substance agreement on file: Yes:  Date 4/25/17.    Documentation in problem list reviewed:  Yes    Processing:  Rx to be electronically transmitted to pharmacy by provider     [place] reviewed:  not reviewed/not due - last done on 11/8/19  [place] profile:  [email or web address]  Morgan Aguilar RN

## 2020-02-28 ENCOUNTER — OFFICE VISIT (OUTPATIENT)
Dept: FAMILY MEDICINE | Facility: CLINIC | Age: 35
End: 2020-02-28
Payer: COMMERCIAL

## 2020-02-28 VITALS
TEMPERATURE: 98 F | RESPIRATION RATE: 16 BRPM | OXYGEN SATURATION: 100 % | DIASTOLIC BLOOD PRESSURE: 83 MMHG | BODY MASS INDEX: 21.74 KG/M2 | SYSTOLIC BLOOD PRESSURE: 119 MMHG | WEIGHT: 122.7 LBS | HEART RATE: 89 BPM | HEIGHT: 63 IN

## 2020-02-28 DIAGNOSIS — L70.0 ACNE VULGARIS: ICD-10-CM

## 2020-02-28 DIAGNOSIS — Z12.4 CERVICAL CANCER SCREENING: ICD-10-CM

## 2020-02-28 DIAGNOSIS — F90.0 ADHD (ATTENTION DEFICIT HYPERACTIVITY DISORDER), INATTENTIVE TYPE: ICD-10-CM

## 2020-02-28 DIAGNOSIS — Z00.00 ROUTINE GENERAL MEDICAL EXAMINATION AT A HEALTH CARE FACILITY: Primary | ICD-10-CM

## 2020-02-28 PROCEDURE — 87624 HPV HI-RISK TYP POOLED RSLT: CPT | Performed by: FAMILY MEDICINE

## 2020-02-28 PROCEDURE — 99213 OFFICE O/P EST LOW 20 MIN: CPT | Mod: 25 | Performed by: FAMILY MEDICINE

## 2020-02-28 PROCEDURE — G0145 SCR C/V CYTO,THINLAYER,RESCR: HCPCS | Performed by: FAMILY MEDICINE

## 2020-02-28 PROCEDURE — 99395 PREV VISIT EST AGE 18-39: CPT | Performed by: FAMILY MEDICINE

## 2020-02-28 RX ORDER — LISDEXAMFETAMINE DIMESYLATE 30 MG/1
30 CAPSULE ORAL DAILY
Qty: 30 CAPSULE | Refills: 0 | Status: SHIPPED | OUTPATIENT
Start: 2020-04-30 | End: 2020-05-30

## 2020-02-28 RX ORDER — TRETINOIN 0.1 MG/G
GEL TOPICAL AT BEDTIME
Qty: 45 G | Refills: 0 | Status: SHIPPED | OUTPATIENT
Start: 2020-02-28

## 2020-02-28 RX ORDER — LISDEXAMFETAMINE DIMESYLATE 30 MG/1
30 CAPSULE ORAL DAILY
Qty: 30 CAPSULE | Refills: 0 | Status: SHIPPED | OUTPATIENT
Start: 2020-02-28 | End: 2020-03-27

## 2020-02-28 RX ORDER — LISDEXAMFETAMINE DIMESYLATE 30 MG/1
30 CAPSULE ORAL DAILY
Qty: 30 CAPSULE | Refills: 0 | Status: SHIPPED | OUTPATIENT
Start: 2020-03-30 | End: 2020-04-29

## 2020-02-28 ASSESSMENT — ENCOUNTER SYMPTOMS
MYALGIAS: 0
FREQUENCY: 0
NERVOUS/ANXIOUS: 0
EYE PAIN: 0
NAUSEA: 0
BREAST MASS: 0
HEARTBURN: 0
HEMATURIA: 0
DIARRHEA: 0
CHILLS: 0
DIZZINESS: 0
SORE THROAT: 0
ABDOMINAL PAIN: 0
PARESTHESIAS: 0
HEADACHES: 0
DYSURIA: 0
ARTHRALGIAS: 0
JOINT SWELLING: 0
PALPITATIONS: 0
SHORTNESS OF BREATH: 0
CONSTIPATION: 0
FEVER: 0
HEMATOCHEZIA: 0
WEAKNESS: 0
COUGH: 0

## 2020-02-28 ASSESSMENT — MIFFLIN-ST. JEOR: SCORE: 1229.3

## 2020-02-28 NOTE — PROGRESS NOTES
SUBJECTIVE:   CC: Alejandra Huertas is an 34 year old woman who presents for preventive health visit.     Healthy Habits:     Getting at least 3 servings of Calcium per day:  Yes    Bi-annual eye exam:  Yes    Dental care twice a year:  Yes    Sleep apnea or symptoms of sleep apnea:  None    Diet:  Regular (no restrictions)    Frequency of exercise:  4-5 days/week    Duration of exercise:  45-60 minutes    Taking medications regularly:  Yes    Medication side effects:  None    PHQ-2 Total Score: 0    Additional concerns today:  No      ADHD Follow-Up (Adult)  Concerns: none   Changes since last visit: Stable  Taking controlled (daily) medications as prescribed: Yes  Sleep: no problems     Medication Benefits:   Controlled symptoms: Attention span, Distractability, Finishing tasks, Impulse control, Frustration tolerance, Accepting limits and Peer relations  Uncontrolled symptoms:  None     Medication Side Effects:  Reports:  none  Sleep Problems? no       Employer Concerns/Feedback: Stable  Coworker Concerns:   Stable  Home/Family Concerns: Stable      Acne worsening over the last few months.   Notes her facial regimen has not changed. Was on birth control in the past but no longer on it.       Today's PHQ-2 Score:   PHQ-2 ( 1999 [employer]) 2/28/2020   Q1: Little interest or pleasure in doing things 0   Q2: Feeling down, depressed or hopeless 0   PHQ-2 Score 0   Q1: Little interest or pleasure in doing things Not at all   Q2: Feeling down, depressed or hopeless Not at all   PHQ-2 Score 0       Abuse: Current or Past(Physical, Sexual or Emotional)- No  Do you feel safe in your environment? Yes        Social History     Tobacco Use     Smoking status: Never Smoker     Smokeless tobacco: Never Used   Substance Use Topics     Alcohol use: No     If you drink alcohol do you typically have >3 drinks per day or >7 drinks per week? No    Alcohol Use 2/28/2020   Prescreen: >3 drinks/day or >7 drinks/week? No   Prescreen: >3  "drinks/day or >7 drinks/week? -   No flowsheet data found.    Reviewed orders with patient.  Reviewed health maintenance and updated orders accordingly - Yes  Labs reviewed in EPIC    Mammogram not appropriate for this patient based on age.    Pertinent mammograms are reviewed under the imaging tab.  History of abnormal Pap smear: NO - age 30-65 PAP every 5 years with negative HPV co-testing recommended     Reviewed and updated as needed this visit by clinical staff  Tobacco  Allergies  Meds  Problems  Soc Hx        Reviewed and updated as needed this visit by Provider  Meds  Problems            Review of Systems   Constitutional: Negative for chills and fever.   HENT: Negative for congestion, ear pain, hearing loss and sore throat.    Eyes: Negative for pain and visual disturbance.   Respiratory: Negative for cough and shortness of breath.    Cardiovascular: Negative for chest pain, palpitations and peripheral edema.   Gastrointestinal: Negative for abdominal pain, constipation, diarrhea, heartburn, hematochezia and nausea.   Breasts:  Negative for tenderness, breast mass and discharge.   Genitourinary: Positive for vaginal bleeding. Negative for dysuria, frequency, genital sores, hematuria, pelvic pain, urgency and vaginal discharge.   Musculoskeletal: Negative for arthralgias, joint swelling and myalgias.   Skin: Negative for rash.   Neurological: Negative for dizziness, weakness, headaches and paresthesias.   Psychiatric/Behavioral: Negative for mood changes. The patient is not nervous/anxious.           OBJECTIVE:   /83 (BP Location: Right arm, Patient Position: Chair, Cuff Size: Adult Regular)   Pulse 89   Temp 98  F (36.7  C) (Oral)   Resp 16   Ht 1.59 m (5' 2.6\")   Wt 55.7 kg (122 lb 11.2 oz)   SpO2 100%   BMI 22.02 kg/m    Physical Exam  GENERAL: healthy, alert and no distress  EYES: Eyes grossly normal to inspection, PERRL and conjunctivae and sclerae normal  HENT: ear canals and TM's " normal, nose and mouth without ulcers or lesions  NECK: no adenopathy, no asymmetry, masses, or scars and thyroid normal to palpation  RESP: lungs clear to auscultation - no rales, rhonchi or wheezes  BREAST: normal without masses, tenderness or nipple discharge and no palpable axillary masses or adenopathy  CV: regular rate and rhythm, normal S1 S2  ABDOMEN: soft, nontender,  and bowel sounds normal   (female): normal female external genitalia, normal urethral meatus, vaginal mucosa pink, moist, well rugated, and normal cervix/adnexa/uterus without masses or discharge  MS: no gross musculoskeletal defects noted, no edema  SKIN: acne vulgaris face   NEURO: Normal strength and tone, mentation intact and speech normal  PSYCH: mentation appears normal, affect normal/bright    Diagnostic Test Results:  Labs reviewed in Epic  none     ASSESSMENT/PLAN:   1. Routine general medical examination at a health care facility  - Lipid panel reflex to direct LDL Fasting; Future  - **Comprehensive metabolic panel FUTURE anytime; Future  - **CBC with platelets FUTURE anytime; Future    2. Cervical cancer screening  - Pap imaged thin layer screen with HPV - recommended age 30 - 65 years (select HPV order below)  - HPV High Risk Types DNA Cervical    3. ADHD (attention deficit hyperactivity disorder), inattentive type  - stable   - lisdexamfetamine (VYVANSE) 30 MG capsule; Take 1 capsule (30 mg) by mouth daily  Dispense: 30 capsule; Refill: 0  - lisdexamfetamine (VYVANSE) 30 MG capsule; Take 1 capsule (30 mg) by mouth daily  Dispense: 30 capsule; Refill: 0  - lisdexamfetamine (VYVANSE) 30 MG capsule; Take 1 capsule (30 mg) by mouth daily  Dispense: 30 capsule; Refill: 0    4. Acne vulgaris  - will start on trial of retin A. Side effects reviewed   - tretinoin (RETIN-A) 0.01 % external gel; Apply topically At Bedtime  Dispense: 45 g; Refill: 0    COUNSELING:  Reviewed preventive health counseling, as reflected in patient  "instructions       Regular exercise       Healthy diet/nutrition    Estimated body mass index is 22.02 kg/m  as calculated from the following:    Height as of this encounter: 1.59 m (5' 2.6\").    Weight as of this encounter: 55.7 kg (122 lb 11.2 oz).         reports that she has never smoked. She has never used smokeless tobacco.      Counseling Resources:  ATP IV Guidelines  Pooled Cohorts Equation Calculator  Breast Cancer Risk Calculator  FRAX Risk Assessment  ICSI Preventive Guidelines  Dietary Guidelines for Americans, 2010  USDA's MyPlate  ASA Prophylaxis  Lung CA Screening    Roselia Clancy MD  [facility]  "

## 2020-02-28 NOTE — PATIENT INSTRUCTIONS
3 months evisit and 6 months in clinic     Preventive Health Recommendations  Female Ages 26 - 39  Yearly exam:   See your health care provider every year in order to    Review health changes.     Discuss preventive care.      Review your medicines if you your doctor has prescribed any.    Until age 30: Get a Pap test every three years (more often if you have had an abnormal result).    After age 30: Talk to your doctor about whether you should have a Pap test every 3 years or have a Pap test with HPV screening every 5 years.   You do not need a Pap test if your uterus was removed (hysterectomy) and you have not had cancer.  You should be tested each year for STDs (sexually transmitted diseases), if you're at risk.   Talk to your provider about how often to have your cholesterol checked.  If you are at risk for diabetes, you should have a diabetes test (fasting glucose).  Shots: Get a flu shot each year. Get a tetanus shot every 10 years.   Nutrition:     Eat at least 5 servings of fruits and vegetables each day.    Eat whole-grain bread, whole-wheat pasta and brown rice instead of white grains and rice.    Get adequate Calcium and Vitamin D.     Lifestyle    Exercise at least 150 minutes a week (30 minutes a day, 5 days of the week). This will help you control your weight and prevent disease.    Limit alcohol to one drink per day.    No smoking.     Wear sunscreen to prevent skin cancer.    See your dentist every six months for an exam and cleaning.

## 2020-03-03 LAB
COPATH REPORT: NORMAL
PAP: NORMAL

## 2020-03-04 LAB
FINAL DIAGNOSIS: NORMAL
HPV HR 12 DNA CVX QL NAA+PROBE: NEGATIVE
HPV16 DNA SPEC QL NAA+PROBE: NEGATIVE
HPV18 DNA SPEC QL NAA+PROBE: NEGATIVE
SPECIMEN DESCRIPTION: NORMAL
SPECIMEN SOURCE CVX/VAG CYTO: NORMAL

## 2020-03-09 ENCOUNTER — MYC MEDICAL ADVICE (OUTPATIENT)
Dept: FAMILY MEDICINE | Facility: CLINIC | Age: 35
End: 2020-03-09

## 2020-03-09 DIAGNOSIS — Z00.00 ROUTINE GENERAL MEDICAL EXAMINATION AT A HEALTH CARE FACILITY: ICD-10-CM

## 2020-03-09 LAB
ALBUMIN SERPL-MCNC: 3.9 G/DL (ref 3.4–5)
ALP SERPL-CCNC: 53 U/L (ref 40–150)
ALT SERPL W P-5'-P-CCNC: 24 U/L (ref 0–50)
ANION GAP SERPL CALCULATED.3IONS-SCNC: 4 MMOL/L (ref 3–14)
AST SERPL W P-5'-P-CCNC: 16 U/L (ref 0–45)
BILIRUB SERPL-MCNC: 1 MG/DL (ref 0.2–1.3)
BUN SERPL-MCNC: 11 MG/DL (ref 7–30)
CALCIUM SERPL-MCNC: 9.4 MG/DL (ref 8.5–10.1)
CHLORIDE SERPL-SCNC: 104 MMOL/L (ref 94–109)
CHOLEST SERPL-MCNC: 184 MG/DL
CO2 SERPL-SCNC: 27 MMOL/L (ref 20–32)
CREAT SERPL-MCNC: 0.81 MG/DL (ref 0.52–1.04)
ERYTHROCYTE [DISTWIDTH] IN BLOOD BY AUTOMATED COUNT: 11.8 % (ref 10–15)
GFR SERPL CREATININE-BSD FRML MDRD: >90 ML/MIN/{1.73_M2}
GLUCOSE SERPL-MCNC: 105 MG/DL (ref 70–99)
HCT VFR BLD AUTO: 42.5 % (ref 35–47)
HDLC SERPL-MCNC: 77 MG/DL
HGB BLD-MCNC: 14.3 G/DL (ref 11.7–15.7)
LDLC SERPL CALC-MCNC: 90 MG/DL
MCH RBC QN AUTO: 30.9 PG (ref 26.5–33)
MCHC RBC AUTO-ENTMCNC: 33.6 G/DL (ref 31.5–36.5)
MCV RBC AUTO: 92 FL (ref 78–100)
NONHDLC SERPL-MCNC: 107 MG/DL
PLATELET # BLD AUTO: 234 10E9/L (ref 150–450)
POTASSIUM SERPL-SCNC: 3.6 MMOL/L (ref 3.4–5.3)
PROT SERPL-MCNC: 7.6 G/DL (ref 6.8–8.8)
RBC # BLD AUTO: 4.63 10E12/L (ref 3.8–5.2)
SODIUM SERPL-SCNC: 135 MMOL/L (ref 133–144)
TRIGL SERPL-MCNC: 83 MG/DL
WBC # BLD AUTO: 8 10E9/L (ref 4–11)

## 2020-03-09 PROCEDURE — 80061 LIPID PANEL: CPT | Performed by: FAMILY MEDICINE

## 2020-03-09 PROCEDURE — 85027 COMPLETE CBC AUTOMATED: CPT | Performed by: FAMILY MEDICINE

## 2020-03-09 PROCEDURE — 36415 COLL VENOUS BLD VENIPUNCTURE: CPT | Performed by: FAMILY MEDICINE

## 2020-03-09 PROCEDURE — 80053 COMPREHEN METABOLIC PANEL: CPT | Performed by: FAMILY MEDICINE

## 2020-03-09 NOTE — TELEPHONE ENCOUNTER
Pt notified and states would like to place up at , letter brought to     Kristy Chow/PATIENCE  [place]---[facility]

## 2020-03-09 NOTE — LETTER
March 9, 2020      Alejandra Huertas  [address] [place] [address]        To Whom It May Concern,      Alejandra Huertas is under my care. I have prescribed Vyvanse as an ongoing prescription.     Current Outpatient Medications   Medication     lisdexamfetamine (VYVANSE) 30 MG capsule     [START ON 3/30/2020] lisdexamfetamine (VYVANSE) 30 MG capsule     [START ON 4/30/2020] lisdexamfetamine (VYVANSE) 30 MG capsule     lisdexamfetamine (VYVANSE) 30 MG capsule     tretinoin (RETIN-A) 0.01 % external gel     No current facility-administered medications for this visit.        Please let me know if I can be of further assistance.     Sincerely,        Roselia Clancy MD

## 2020-03-09 NOTE — TELEPHONE ENCOUNTER
Routed to NWD, see [employer] message, letter started, please addend/change if needed, route to TC for processing    Isaura Castro RN, BSN  Message handled by Nurse Triage.

## 2020-03-27 ENCOUNTER — MYC REFILL (OUTPATIENT)
Dept: FAMILY MEDICINE | Facility: CLINIC | Age: 35
End: 2020-03-27

## 2020-03-27 DIAGNOSIS — F90.0 ADHD (ATTENTION DEFICIT HYPERACTIVITY DISORDER), INATTENTIVE TYPE: ICD-10-CM

## 2020-03-27 RX ORDER — LISDEXAMFETAMINE DIMESYLATE 30 MG/1
30 CAPSULE ORAL DAILY
Qty: 30 CAPSULE | Refills: 0 | Status: SHIPPED | OUTPATIENT
Start: 2020-03-27

## 2020-03-27 NOTE — TELEPHONE ENCOUNTER
Controlled Substance Refill Request for vyvanse   Problem List Complete:  Yes  ADHD (attention deficit hyperactivity disorder), inattentive type    Overview Addendum 12/10/2019  8:21 AM by Morgan Aguilar RN    Patient is followed by GAYLE HADLEY for ongoing prescription of stimulants.  All refills should be approved by this provider, or covering partner.     Medication(s): Adderall 20 mg.   Maximum quantity per month: 30  Clinic visit frequency required: Q 6 months      Controlled substance agreement on file: Yes       Date(s): 4/25/17  Neuropsych evaluation for ADD completed:  No     Last [facility] website verification:  done on 03/27/2020   [email or web address]      checked in past 3 months?  Yes checked today 3/27/2020 - no concerns noted, med last filled 2/29/2020 #30 written by pcp

## 2020-12-27 ENCOUNTER — HEALTH MAINTENANCE LETTER (OUTPATIENT)
Age: 35
End: 2020-12-27

## 2021-04-24 ENCOUNTER — HEALTH MAINTENANCE LETTER (OUTPATIENT)
Age: 36
End: 2021-04-24

## 2021-10-09 ENCOUNTER — HEALTH MAINTENANCE LETTER (OUTPATIENT)
Age: 36
End: 2021-10-09

## 2022-05-21 ENCOUNTER — HEALTH MAINTENANCE LETTER (OUTPATIENT)
Age: 37
End: 2022-05-21

## 2022-09-11 ENCOUNTER — HEALTH MAINTENANCE LETTER (OUTPATIENT)
Age: 37
End: 2022-09-11

## 2023-02-21 ENCOUNTER — TRANSFERRED RECORDS (OUTPATIENT)
Dept: HEALTH INFORMATION MANAGEMENT | Facility: CLINIC | Age: 38
End: 2023-02-21

## 2023-06-03 ENCOUNTER — HEALTH MAINTENANCE LETTER (OUTPATIENT)
Age: 38
End: 2023-06-03

## 2023-09-05 NOTE — TELEPHONE ENCOUNTER
"Left message for \"Amira\" (outgoing message name)  to call back .   Morgan Aguilar RN    "
Central Prior Authorization Team   Phone: [phone]    PA Initiation    Medication: vyvanse 30 needs pa  Insurance Company: [employer] - Phone [phone] Fax [phone]  Pharmacy Filling the Rx: [place], [place] - [address]  Filling Pharmacy Phone: [phone]  Filling Pharmacy Fax: [phone]  Start Date: 2/6/2018      
Dr. Cervantes-need to know if you want PA or change med?  Route to P [phone] with reason if PA or do you want patient to call and see if covered options.  Per Pharmacy all ADD medications have been requiring PA's that they are receiving.  Please advise. Laila Vidal RN    
Dr. Cervantes-see PA denial below and covered options.  Please advise.  Laila Vidal RN    
PRIOR AUTHORIZATION DENIED    Medication: vyvanse 30 denied     Denial Date: 2/6/2018    Denial Rational:  Must have tried 3 alternatives     Appeal Information:         
Patient has been stabilized on Vyvance after using Ritalin and Adderall- and even needs occasional supplementation with Adderall.  Please use this as the reason for the Prior Auth.  Thank you.  Alexei PATRICK   
Please contact Patient to review the list of medications to see if she has tried some that I am unaware of.  I am a float at this site and the patient may have taken more than the agents I am aware of. The the PA can be resubmitted.   Thank you,  Alexei PATRICK   
Please do not close this encounter until this has been addressed.  (prior auth approved/denied, prescriber refusal to complete prior auth or medication changed/discontinued)    Prior Authorization needed on: vyvanse 30  Drug NDC: 59417-013-10     Insurance: [place] through [place] (Bin:  [id number], [facility]  Member ID: [id number]   Insurance phone #: [phone]    Pharmacy NPI: [id number]  Pharmacy Phone #: [phone]  Pharmacy Fax #: [phone]    Please let us know if the PA gets approved or denied or if medication is changed  *Please add documentation and forward to prior auth team at [id number].    Thanks,  Tiffany Sherwood, [place]  [facility]  [phone]    
Pt returned call,  States had tried several when she was younger, but they didn't work great    Is willing to try the Adderall XR again    Can walk rx to our clinic pharmacy and she will pick it up later today    Paulette Grissom RN, BS  Clinical Nurse Triage.    
RX walked over to the [facility] pharmacy per patient    Kristy Chow/PATIENCE  [place]---[facility]    
Render In Strict Bullet Format?: No
Detail Level: Zone
Initiate Treatment: triamcinolone acetonide 0.1 % topical cream BID